# Patient Record
Sex: MALE | Race: WHITE | NOT HISPANIC OR LATINO | Employment: OTHER | URBAN - METROPOLITAN AREA
[De-identification: names, ages, dates, MRNs, and addresses within clinical notes are randomized per-mention and may not be internally consistent; named-entity substitution may affect disease eponyms.]

---

## 2018-03-29 ENCOUNTER — HISTORICAL (OUTPATIENT)
Dept: HEPATOLOGY | Facility: HOSPITAL | Age: 68
End: 2018-03-29

## 2018-03-29 LAB — POC CREATININE: 1 MG/DL (ref 0.6–1.3)

## 2018-04-02 ENCOUNTER — HISTORICAL (OUTPATIENT)
Dept: LAB | Facility: HOSPITAL | Age: 68
End: 2018-04-02

## 2018-04-02 LAB
ABS NEUT (OLG): 2.75 X10(3)/MCL (ref 2.1–9.2)
APPEARANCE, UA: CLEAR
BACTERIA SPEC CULT: NORMAL /HPF
BASOPHILS # BLD AUTO: 0 X10(3)/MCL (ref 0–0.2)
BASOPHILS NFR BLD AUTO: 0 %
BILIRUB UR QL STRIP: NEGATIVE
BUN SERPL-MCNC: 25 MG/DL (ref 7–18)
CALCIUM SERPL-MCNC: 9.3 MG/DL (ref 8.5–10.1)
CHLORIDE SERPL-SCNC: 103 MMOL/L (ref 98–107)
CO2 SERPL-SCNC: 29 MMOL/L (ref 21–32)
COLOR UR: YELLOW
CREAT SERPL-MCNC: 0.78 MG/DL (ref 0.7–1.3)
CREAT/UREA NIT SERPL: 32.1
EOSINOPHIL # BLD AUTO: 0.2 X10(3)/MCL (ref 0–0.9)
EOSINOPHIL NFR BLD AUTO: 4 %
ERYTHROCYTE [DISTWIDTH] IN BLOOD BY AUTOMATED COUNT: 13.2 % (ref 11.5–17)
GLUCOSE (UA): NEGATIVE
GLUCOSE SERPL-MCNC: 94 MG/DL (ref 74–106)
HCT VFR BLD AUTO: 39.4 % (ref 42–52)
HGB BLD-MCNC: 12.7 GM/DL (ref 14–18)
HGB UR QL STRIP: NEGATIVE
KETONES UR QL STRIP: NEGATIVE
LEUKOCYTE ESTERASE UR QL STRIP: NEGATIVE
LYMPHOCYTES # BLD AUTO: 1.6 X10(3)/MCL (ref 0.6–4.6)
LYMPHOCYTES NFR BLD AUTO: 30 %
MCH RBC QN AUTO: 30 PG (ref 27–31)
MCHC RBC AUTO-ENTMCNC: 32.2 GM/DL (ref 33–36)
MCV RBC AUTO: 92.9 FL (ref 80–94)
MONOCYTES # BLD AUTO: 0.6 X10(3)/MCL (ref 0.1–1.3)
MONOCYTES NFR BLD AUTO: 12 %
MRSA SCREEN BY PCR: NEGATIVE
NEUTROPHILS # BLD AUTO: 2.75 X10(3)/MCL (ref 1.4–7.9)
NEUTROPHILS NFR BLD AUTO: 53 %
NITRITE UR QL STRIP: NEGATIVE
PH UR STRIP: 7 [PH] (ref 5–9)
PLATELET # BLD AUTO: 183 X10(3)/MCL (ref 130–400)
PMV BLD AUTO: 10.3 FL (ref 9.4–12.4)
POTASSIUM SERPL-SCNC: 4.4 MMOL/L (ref 3.5–5.1)
PROT UR QL STRIP: NEGATIVE
RBC # BLD AUTO: 4.24 X10(6)/MCL (ref 4.7–6.1)
RBC #/AREA URNS HPF: NORMAL /[HPF]
SODIUM SERPL-SCNC: 138 MMOL/L (ref 136–145)
SP GR UR STRIP: 1.02 (ref 1–1.03)
SQUAMOUS EPITHELIAL, UA: NORMAL
UA WBC MAN: NORMAL
UROBILINOGEN UR STRIP-ACNC: 0.2
WBC # SPEC AUTO: 5.2 X10(3)/MCL (ref 4.5–11.5)
WBC #/AREA URNS HPF: NORMAL /[HPF]

## 2018-04-13 ENCOUNTER — HISTORICAL (OUTPATIENT)
Dept: RADIOLOGY | Facility: HOSPITAL | Age: 68
End: 2018-04-13

## 2018-05-01 ENCOUNTER — HISTORICAL (OUTPATIENT)
Dept: ADMINISTRATIVE | Facility: HOSPITAL | Age: 68
End: 2018-05-01

## 2018-05-01 LAB
ABS NEUT (OLG): 3.31 X10(3)/MCL (ref 2.1–9.2)
BASOPHILS # BLD AUTO: 0 X10(3)/MCL (ref 0–0.2)
BASOPHILS NFR BLD AUTO: 0 %
BUN SERPL-MCNC: 18 MG/DL (ref 7–18)
CALCIUM SERPL-MCNC: 8.9 MG/DL (ref 8.5–10.1)
CHLORIDE SERPL-SCNC: 109 MMOL/L (ref 98–107)
CO2 SERPL-SCNC: 25 MMOL/L (ref 21–32)
CREAT SERPL-MCNC: 0.74 MG/DL (ref 0.7–1.3)
CREAT/UREA NIT SERPL: 24.3
EOSINOPHIL # BLD AUTO: 0.2 X10(3)/MCL (ref 0–0.9)
EOSINOPHIL NFR BLD AUTO: 4 %
ERYTHROCYTE [DISTWIDTH] IN BLOOD BY AUTOMATED COUNT: 13.2 % (ref 11.5–17)
GLUCOSE SERPL-MCNC: 97 MG/DL (ref 74–106)
HCT VFR BLD AUTO: 35.7 % (ref 42–52)
HGB BLD-MCNC: 11.3 GM/DL (ref 14–18)
LYMPHOCYTES # BLD AUTO: 1.5 X10(3)/MCL (ref 0.6–4.6)
LYMPHOCYTES NFR BLD AUTO: 26 %
MCH RBC QN AUTO: 29.6 PG (ref 27–31)
MCHC RBC AUTO-ENTMCNC: 31.7 GM/DL (ref 33–36)
MCV RBC AUTO: 93.5 FL (ref 80–94)
MONOCYTES # BLD AUTO: 0.7 X10(3)/MCL (ref 0.1–1.3)
MONOCYTES NFR BLD AUTO: 12 %
NEUTROPHILS # BLD AUTO: 3.31 X10(3)/MCL (ref 2.1–9.2)
NEUTROPHILS NFR BLD AUTO: 57 %
PLATELET # BLD AUTO: 160 X10(3)/MCL (ref 130–400)
PMV BLD AUTO: 9.4 FL (ref 9.4–12.4)
POTASSIUM SERPL-SCNC: 4.3 MMOL/L (ref 3.5–5.1)
RBC # BLD AUTO: 3.82 X10(6)/MCL (ref 4.7–6.1)
SODIUM SERPL-SCNC: 140 MMOL/L (ref 136–145)
WBC # SPEC AUTO: 5.8 X10(3)/MCL (ref 4.5–11.5)

## 2021-01-26 ENCOUNTER — HISTORICAL (OUTPATIENT)
Dept: ADMINISTRATIVE | Facility: HOSPITAL | Age: 71
End: 2021-01-26

## 2021-05-13 ENCOUNTER — HOSPITAL ENCOUNTER (OUTPATIENT)
Dept: INTENSIVE CARE | Facility: HOSPITAL | Age: 71
End: 2021-05-14
Attending: NEUROLOGICAL SURGERY

## 2021-11-18 ENCOUNTER — HISTORICAL (OUTPATIENT)
Dept: ADMINISTRATIVE | Facility: HOSPITAL | Age: 71
End: 2021-11-18

## 2022-04-10 ENCOUNTER — HISTORICAL (OUTPATIENT)
Dept: ADMINISTRATIVE | Facility: HOSPITAL | Age: 72
End: 2022-04-10
Payer: MEDICARE

## 2022-04-24 VITALS
WEIGHT: 215 LBS | HEIGHT: 68 IN | SYSTOLIC BLOOD PRESSURE: 116 MMHG | DIASTOLIC BLOOD PRESSURE: 80 MMHG | BODY MASS INDEX: 32.58 KG/M2

## 2022-04-30 NOTE — OP NOTE
Patient:   Lloyd MDRaul            MRN: [id number]            FIN: [id number]               Age:   70 years     Sex:  Male     :  1950   Associated Diagnoses:   None   Author:   Azam Lacy MD      Operative Note   DATE OF OPERATION:  May 13, 2021    PREOPERATIVE DIAGNOSIS:  1.  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  Lumbar central and lateral recess stenosis with neurogenic claudication    SURGEON:  Azam Lacy M.D.   ASSISTANT: FLIP Ravi    PROCEDURE:  1.  Bilateral L2 hemilaminotomies and bilateral L2-3 medial facetectomies and foraminotomies  2.  Bilateral L3 hemilaminotomies and bilateral L3-4 medial facetectomies and foraminotomies  3.  Bilateral L4 hemilaminotomies and bilateral L4-5 medial facetectomies and foraminotomies  4.  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  He did well from that standpoint, but has had progressively worsening lower extremity pain, numbness, weakness and significant gait disturbance..  Imaging study showed severe central and lateral recess stenosis at L2-3, L3-4 and L4-5.  Options were discussed and, after conservative management failed, surgery was elected.  The patient and their family understood and accepted the nature of this surgery as well as its attendant risks.    FINDINGS:  There were no untoward findings.  As expected, there was severe central and lateral recess stenosis at all 3 levels on a multifactorial basis including facet arthropathy, ligamentous buckling and disc bulging.  There is excellent decompression of the thecal sac as well as the exiting/traversing nerve  roots at each level at the completion of the procedure.  A drain was placed over the laminotomy defects and brought out through a separate stab incision.    PROCEDURE IN DETAIL:  After endotracheal intubation and induction of general anesthesia, the patient was placed in the prone position on the spinal frame with pressure points appropriately padded.  The frame was cranked up and the back was prepped and draped in the usual fashion.  The patient received intravenous antibiotics prior to the start of the procedure.  The C-arm was used to guide the placement of the initial incision.    A 2 cm incision was made through the skin, subcutaneous tissues and fascia on the appropriate side after infiltrating the skin and muscle with 1/4% Marcaine containing 1/200,000 epinephrine.  Then progressive dilators were inserted down to the lamina and then a 16-18 mm guide tube was put into place and positioned appropriately according to the C-arm.  Then the operating microscope was brought into place.  Muscle was cleared off of the lateral inferior portion of the lamina and then the high speed drill, curette and Kerrison rongeurs were used to create a hemilaminotomy on the ipsilateral side.  The superior articulating facet of the inferior level was then undercut and then the ligamentum flavum was excised.  Once complete decompression of the lateral recess and proximal foramen was achieved, then the guide tube was angled medially, the base of the spinolaminar junction was removed with the high-speed drill and a combination of curettes and Kerrison rongeurs were then used to decompress the contralateral lateral recess and foramen. Meticulous hemostasis was achieved with a combination of bipolar cautery and hemostatic agent which was removed at completion. The wound was irrigated copiously with antibiotic irrigating solution.  The same procedure was carried out at each additional level.  A drain was placed over the laminotomy defects  and brought out through a separate stab incision.    The guide tube was removed slowly in a rotating fashion with bipolar cautery of the soft tissues and then the fascia was closed with 0 Vicryl and the subcutaneous tissue was closed with 2-0 Vicryl in separate layers.  Dermabond skin glue was used for the skin edges and the patient was returned to the supine position.  The patient was then taken to the post anesthetic care unit in satisfactory condition with correct sponge and needle counts.

## 2022-04-30 NOTE — H&P
Patient:   Raul Desai            MRN: [id number]            FIN: [id number]               Age:   67 years     Sex:  Male     :  1950   Associated Diagnoses:   None   Author:   Regino HERNÁNDEZ, Azam      Health Status   The H&P was reviewed, the patient was examined, and there are no changes to the patient's condition..

## 2022-04-30 NOTE — H&P
Patient:   Raul Desai            MRN: [id number]            FIN: [id number]               Age:   70 years     Sex:  Male     :  1950   Associated Diagnoses:   None   Author:   Britni RENEE, Ligia Burks      Health Status   The H&P was reviewed, the patient was examined, and there are no changes to the patient's condition..

## 2022-04-30 NOTE — OP NOTE
"   Patient:   Raul Desai            MRN: [id number]            FIN: [id number]               Age:   67 years     Sex:  Male     :  1950   Associated Diagnoses:   None   Author:   Azam Lacy MD      Operative Note   DATE OF OPERATION:  May 1, 2018    PREOPERATIVE DIAGNOSIS:  1.Medically intractable Parkinson's disease    POSTOPERATIVE DIAGNOSIS:  1.Medically intractable Parkinson's disease    SURGEON:  Azam Lacy M.D.   ASSISTANT: Ligia CASTELAN    PROCEDURE:  1.  Left DBS IPG implantation for bilateral STN DBS electrodes with [employer] Activa PC generator; stage 2 of 2  2. Interrogation of the DBS IPG    ANESTHESIA:  General endotracheal    BLOOD LOSS:  Negligible    SPECIMEN(s):  None    COMPLICATIONS:  None    HISTORY:  Raul Desai is a 67-year-old gentleman who on 18 underwent bilateral STN DBS implantation without incident.  He actually had excellent tremor control for about a week but it has returned, as expected, to baseline.  Options were discussed and the patient now presents for stage 2 of 2; insertion and connection of the IPG to the existing DBS leads. The patient understood and accepted the nature of the surgery as well as its attendant risks.    FINDINGS:  There were no untoward findings. Impedance checks were all within normal limits. The opaque "boot" was placed over the connection for the right sided electrode. The patient tolerated the procedure well.    PROCEDURE IN DETAIL:  After endotracheal intubation and induction of general anesthesia, the patient's head was placed on a donut and turned away from the planned operative side. The patient received an appropriate dose of intravenous antibiotic prior to the start of procedure. The ipsilateral side of the head, neck and chest were shaved, prepped and draped in the usual fashion.  The region of the scalp where the distal portion of the electrodes had been placed was marked out and infiltrated with 1/2% " "Xylocaine containing 1:200,000 Epinephrine.  An incision was also marked out and infiltrated under the ipsilateral infraclavicular region.  The scalp incision was carried down to the skin and subcutaneous tissues of the knife and then dissection was carried out to find the distal end of the electrode array.  This was identified and pulled into the operative site.  Then attention was paid to the infraclavicular incision which was carried down through the skin and subcutaneous tissues with the knife.  Sharp and blunt dissection was carried out subcutaneously inferiorly to create a pocket for the pulse generator.  Then, the electrode passer was inserted into the scalp incision and brought out through the infraclavicular incision.  The proximal ends of the extension leads were then threaded up into the passer and then the passer was removed from the scalp incision.  The distal portion of the DBS electrodes were then exposed and attached to the proximal end of the extension lead after placing new "boots" over the DBS electrodes.  These was then secured with "finger tight" screw tightening using the torque limiting  and 2-0 silk ties over the "boots".   Then the distal portion of the extension leads were attached to the pulse generator and these screws were also tightened.  Then the generator was placed into the pocket, keeping any excess lead underneath the generator. The system was tested for impedance of each contact. The generator was secured to the fascia with a single 3-0 Prolene suture.  The wound was irrigated copiously with antibiotic irrigation.  The wound was then closed without any tension with 2-0 Vicryl for the subcutaneous layer and Dermabond glue for the skin edges.  The scalp incision was also closed after irrigating copiously with antibiotic irrigation with 2-0 Vicryl for the galea and staples for the skin edges.  Local dressings were applied and the patient was taken to the post anesthetic care " unit in satisfactory condition with correct sponge and needle counts.

## 2022-06-07 PROBLEM — M48.061 SPINAL STENOSIS OF LUMBAR REGION: Status: ACTIVE | Noted: 2022-06-07

## 2022-06-07 PROBLEM — Z96.89 S/P DEEP BRAIN STIMULATOR PLACEMENT: Chronic | Status: ACTIVE | Noted: 2022-06-07

## 2022-06-07 PROBLEM — G20.A1 PARKINSON'S DISEASE: Status: ACTIVE | Noted: 2022-06-07

## 2022-06-07 PROBLEM — R41.89 IMPAIRED COGNITION: Status: ACTIVE | Noted: 2022-06-07

## 2022-06-07 PROBLEM — F48.2 PSEUDOBULBAR AFFECT: Status: ACTIVE | Noted: 2022-06-07

## 2022-06-07 PROBLEM — G20.A1 PARKINSON'S DISEASE: Chronic | Status: ACTIVE | Noted: 2022-06-07

## 2022-06-07 PROBLEM — Z96.89 S/P DEEP BRAIN STIMULATOR PLACEMENT: Status: ACTIVE | Noted: 2022-06-07

## 2022-06-07 PROBLEM — F48.2 PSEUDOBULBAR AFFECT: Chronic | Status: ACTIVE | Noted: 2022-06-07

## 2022-06-07 PROBLEM — R41.89 IMPAIRED COGNITION: Chronic | Status: ACTIVE | Noted: 2022-06-07

## 2022-06-07 PROBLEM — M48.061 SPINAL STENOSIS OF LUMBAR REGION: Chronic | Status: ACTIVE | Noted: 2022-06-07

## 2022-06-07 RX ORDER — CARBIDOPA AND LEVODOPA 25; 100 MG/1; MG/1
2 TABLET ORAL 4 TIMES DAILY
COMMUNITY
Start: 2021-08-31 | End: 2023-05-19 | Stop reason: SDUPTHER

## 2022-06-07 RX ORDER — BACLOFEN 20 MG/1
TABLET ORAL
COMMUNITY
Start: 2021-10-07 | End: 2022-06-08

## 2022-06-07 RX ORDER — AMANTADINE HYDROCHLORIDE 100 MG/1
CAPSULE, GELATIN COATED ORAL
COMMUNITY
Start: 2021-12-06 | End: 2022-08-29

## 2022-06-07 RX ORDER — CARBIDOPA AND LEVODOPA 25; 100 MG/1; MG/1
TABLET, EXTENDED RELEASE ORAL
COMMUNITY
Start: 2021-08-25 | End: 2022-06-08

## 2022-06-07 RX ORDER — ACETAMINOPHEN 500 MG
10 TABLET ORAL NIGHTLY
COMMUNITY
Start: 2021-10-07

## 2022-06-07 RX ORDER — GABAPENTIN 100 MG/1
100 CAPSULE ORAL NIGHTLY
COMMUNITY
Start: 2021-08-25 | End: 2022-09-19

## 2022-06-07 NOTE — ASSESSMENT & PLAN NOTE
Changes:  No progr changes today but needs new batter and wants recharg     Programming minutes:  15m

## 2022-06-07 NOTE — PROGRESS NOTES
"Subjective:         Patient ID: Raul Desai is a 71 y.o. male.    Chief Complaint: F/U PD-DBS.    HPI:            (Pts wife is here with the pt today. Pt states he has a shuffled-freezing gait w falls. Ambulates with a walker. Pt denies any tremors or hallucinations. Pt has diff w speech, swallowing and drooling. Sleeping well vivid dreams. Pt does not drive, lives at home with his wife and is able to do all ADL's alone. Pt had back surgery in 02/2022. Pts wife state rt leg and foot move all the time.)      Review of Systems    [facility]  ...Here for PD, DBS f/u  ..  pt reports tremors are well controlled; cont w [id number] (2 tabs QID) and CD/LD CR 25-100mg (1 tab qhs).   amantadine four times daily  crying spells have resolved; taking Nuedexta (1 tab BID) and has been significantly helpful for him.    dyskinesia in R foot and R hand since last visit.   cont w occ falls "we see the floor alot"    use of walker at all times, diff w depth perception and misjudging distances.  1. Parkinson disease G20 if today's DBS adj's don't help the R dyskinesias then he could decrease the levodopa to 1.5 each of four times per day  2. S/P deep brain stimulator placement Z96.89 programming done    aim for 3 mos return                Social History     Socioeconomic History    Marital status:    Tobacco Use    Smoking status: Never Smoker    Smokeless tobacco: Never Used   Substance and Sexual Activity    Alcohol use: Not Currently    Drug use: Never         Current Outpatient Medications:     carbidopa-levodopa  mg (SINEMET CR)  mg TbSR, Take by mouth., Disp: , Rfl:     dextromethorphan-quinidine 20-10 mg (NUEDEXTA) 20-10 mg per capsule, Take by mouth., Disp: , Rfl:     amantadine HCL (SYMMETREL) 100 mg capsule,  See Instructions, TAKE 1 CAPSULE BY MOUTH FOUR TIMES DAILY, # 120 cap(s), 5 Refill(s), Pharmacy: [id number], 172.72, cm, Height/Length " "Dosing, 10/07/21 7:38:00 CDT, 97.52, kg, Weight Dosing, 10/07/21 7:38:00 CDT, Disp: , Rfl:     carbidopa-levodopa  mg (SINEMET)  mg per tablet,  See Instructions, TAKE 2 TABLETS BY MOUTH FOUR TIMES DAILY, # 720 tab(s), 2 Refill(s), Pharmacy: [facility] #[id number], 172.72, cm, Height/Length Dosing, 08/25/21 8:05:00 CDT, 97.52, kg, Weight Dosing, 08/25/21 8:05:00 CDT, Disp: , Rfl:     cholecalciferol, vitamin D3, 100 mcg (4,000 unit) Cap capsule, Take by mouth., Disp: , Rfl:     gabapentin (NEURONTIN) 100 MG capsule, Take 100 mg by mouth., Disp: , Rfl:     gabapentin (NEURONTIN) 100 MG capsule, Take 100 mg by mouth., Disp: , Rfl:     lisinopriL (PRINIVIL,ZESTRIL) 5 MG tablet, Take 5 mg by mouth once daily., Disp: , Rfl:     melatonin (MELATIN) 5 mg, Take 5 mg by mouth., Disp: , Rfl:     MULTIVITAMIN ORAL, Take by mouth., Disp: , Rfl:     rosuvastatin (CRESTOR) 10 MG tablet, Take 10 mg by mouth once daily., Disp: , Rfl:      levod IR 2  Qid and one bedtime (CR)    Objective:        Exam    BP (!) 132/92   Ht 5' 8" (1.727 m)   Wt 97.1 kg (214 lb)   BMI 32.54 kg/m²       General:   __unacccompanied       accompanied, by:_  Wife     heart__  pharynx:    Neurological    Speech: dysarthric   cranial nerves:  vis fields:  EOMs: ok   funduscopic:  motor  coord:   Gait:  Walker     Imaging:   Images and imaging reports reviewed.  Study / studies:   Rads summary:  radiologist:  My comments:     Sleep study data or PAP adherence data:     Labs:      meds:      _._ multiple issues/ diagnoses or problems [if not enumerated in note then discussed in encounter but chose to or failed to document]    complexity of data   _._high _mod   __ images and reports reviewed:  _._ hx obtained from family or accompaniment:   __other studies reviewed   __studies ordered __   __studies discussed but not ordered __  __DDx discussed __    risks  .__high _mod   _._ neurodegenerative condition expected to progress( " possible or definite)   __ autoimmune condition with possibility of flares or unexpected attack( poss or def)    __ seiz d.o. with possib of recurr seiz's (poss or def)      __ recent TIA/stroke or chronic cerebrovasc ds with risk of recurrence of stroke  __ potentially high risk meds (and/or) CNS medications which may cause medical or behavioral side effects  __. fall risk  _._ driving discussed   __ diagnosis unclear or DDx wide making risk uncertain to high  __other:    MDM/Medical Decision Making used for CPT choice based on above elements:  _._high _moderate           Assessment/Plan:       Problem List Items Addressed This Visit        Neuro    Parkinson's disease - Primary (Chronic)    Current Assessment & Plan     Med  Changes:  No changes            S/P deep brain stimulator placement (Chronic)    Current Assessment & Plan     Changes:  No progr changes today but needs new batter and wants recharg     Programming minutes:  15m            Impaired cognition (Chronic)    Current Assessment & Plan     Discussed donep   Held off       Advised he not drive            Spinal stenosis of lumbar region (Chronic)       Psychiatric    Pseudobulbar affect (Chronic)    Current Assessment & Plan     Laughed at times when I thought he'd frown                  Other comments/ follow up:          No orders of the defined types were placed in this encounter.          __new Rx/ med prescribed  __med/ Rx         modified  __med               refilled     Aim follow up _3__ months _.__Dr. LANDAVERDE   Dbs day       __NANDO NP       __DAI, NP     Vitaly Adams MD

## 2022-06-08 ENCOUNTER — OFFICE VISIT (OUTPATIENT)
Dept: NEUROLOGY | Facility: CLINIC | Age: 72
End: 2022-06-08
Payer: MEDICARE

## 2022-06-08 VITALS
BODY MASS INDEX: 32.43 KG/M2 | WEIGHT: 214 LBS | DIASTOLIC BLOOD PRESSURE: 92 MMHG | SYSTOLIC BLOOD PRESSURE: 132 MMHG | HEIGHT: 68 IN

## 2022-06-08 DIAGNOSIS — F48.2 PSEUDOBULBAR AFFECT: Chronic | ICD-10-CM

## 2022-06-08 DIAGNOSIS — Z96.89 S/P DEEP BRAIN STIMULATOR PLACEMENT: Chronic | ICD-10-CM

## 2022-06-08 DIAGNOSIS — M48.062 SPINAL STENOSIS OF LUMBAR REGION WITH NEUROGENIC CLAUDICATION: Chronic | ICD-10-CM

## 2022-06-08 DIAGNOSIS — R41.89 IMPAIRED COGNITION: Chronic | ICD-10-CM

## 2022-06-08 DIAGNOSIS — G20.A1 PARKINSON'S DISEASE: Primary | Chronic | ICD-10-CM

## 2022-06-08 PROCEDURE — 95983 PR ELEC ANALYSIS, IMPLT NEURO PULSE GEN, W/PRGRM, BRAIN, 1ST 15 MINS: ICD-10-PCS | Mod: S$PBB,,, | Performed by: SPECIALIST

## 2022-06-08 PROCEDURE — 99215 OFFICE O/P EST HI 40 MIN: CPT | Mod: 25,S$PBB,, | Performed by: SPECIALIST

## 2022-06-08 PROCEDURE — 95983 ALYS BRN NPGT PRGRMG 15 MIN: CPT | Mod: PBBFAC | Performed by: SPECIALIST

## 2022-06-08 PROCEDURE — 99215 PR OFFICE/OUTPT VISIT, EST, LEVL V, 40-54 MIN: ICD-10-PCS | Mod: 25,S$PBB,, | Performed by: SPECIALIST

## 2022-06-08 PROCEDURE — 99999 PR PBB SHADOW E&M-EST. PATIENT-LVL III: ICD-10-PCS | Mod: PBBFAC,,, | Performed by: SPECIALIST

## 2022-06-08 PROCEDURE — 95983 ALYS BRN NPGT PRGRMG 15 MIN: CPT | Mod: S$PBB,,, | Performed by: SPECIALIST

## 2022-06-08 PROCEDURE — 99213 OFFICE O/P EST LOW 20 MIN: CPT | Mod: PBBFAC,25 | Performed by: SPECIALIST

## 2022-06-08 PROCEDURE — 99999 PR PBB SHADOW E&M-EST. PATIENT-LVL III: CPT | Mod: PBBFAC,,, | Performed by: SPECIALIST

## 2022-06-08 RX ORDER — GABAPENTIN 100 MG/1
100 CAPSULE ORAL NIGHTLY
COMMUNITY
End: 2023-11-13

## 2022-06-08 RX ORDER — CARBIDOPA AND LEVODOPA 25; 100 MG/1; MG/1
1 TABLET, EXTENDED RELEASE ORAL NIGHTLY
COMMUNITY
Start: 2022-03-09 | End: 2023-02-20

## 2022-06-08 RX ORDER — ROSUVASTATIN CALCIUM 10 MG/1
1 TABLET, COATED ORAL DAILY
COMMUNITY
Start: 2022-05-26

## 2022-06-08 RX ORDER — DEXTROMETHORPHAN HYDROBROMIDE AND QUINIDINE SULFATE 20; 10 MG/1; MG/1
1 CAPSULE, GELATIN COATED ORAL EVERY 12 HOURS
COMMUNITY
Start: 2022-03-09 | End: 2022-12-30 | Stop reason: SDUPTHER

## 2022-06-08 RX ORDER — LISINOPRIL 5 MG/1
5 TABLET ORAL DAILY
COMMUNITY
Start: 2022-05-26

## 2022-06-14 ENCOUNTER — TELEPHONE (OUTPATIENT)
Dept: NEUROSURGERY | Facility: CLINIC | Age: 72
End: 2022-06-14
Payer: MEDICARE

## 2022-06-14 NOTE — TELEPHONE ENCOUNTER
Received referral from Dr. Adams for IPG replacement. They do not have an interrogation, but per [employer] rep, his battery needs to be replaced within the next 2 months. The battery voltage is 2.58v and will turn off at 2.4v. Surgery is 7/5, office visit/preop here on 6/27 w/ Ligia. patient is aware.

## 2022-06-27 ENCOUNTER — OFFICE VISIT (OUTPATIENT)
Dept: NEUROSURGERY | Facility: CLINIC | Age: 72
End: 2022-06-27
Payer: MEDICARE

## 2022-06-27 VITALS
HEART RATE: 80 BPM | HEIGHT: 68 IN | DIASTOLIC BLOOD PRESSURE: 73 MMHG | BODY MASS INDEX: 32.13 KG/M2 | WEIGHT: 212 LBS | RESPIRATION RATE: 16 BRPM | SYSTOLIC BLOOD PRESSURE: 114 MMHG

## 2022-06-27 DIAGNOSIS — I10 HYPERTENSION, UNSPECIFIED TYPE: ICD-10-CM

## 2022-06-27 DIAGNOSIS — G89.18 ACUTE POST-OPERATIVE PAIN: ICD-10-CM

## 2022-06-27 DIAGNOSIS — Z45.42 END OF BATTERY LIFE OF DEEP BRAIN STIMULATOR: ICD-10-CM

## 2022-06-27 DIAGNOSIS — G20.A1 PARKINSON'S DISEASE: Primary | ICD-10-CM

## 2022-06-27 DIAGNOSIS — Z96.89 S/P DEEP BRAIN STIMULATOR PLACEMENT: Chronic | ICD-10-CM

## 2022-06-27 PROCEDURE — 95972 ALYS CPLX SP/PN NPGT W/PRGRM: CPT | Mod: ,,, | Performed by: NURSE PRACTITIONER

## 2022-06-27 PROCEDURE — 99214 PR OFFICE/OUTPT VISIT, EST, LEVL IV, 30-39 MIN: ICD-10-PCS | Mod: ,,, | Performed by: NURSE PRACTITIONER

## 2022-06-27 PROCEDURE — 95972 PR PRG SPNL GEN CMPLX: ICD-10-PCS | Mod: ,,, | Performed by: NURSE PRACTITIONER

## 2022-06-27 PROCEDURE — 99214 OFFICE O/P EST MOD 30 MIN: CPT | Mod: ,,, | Performed by: NURSE PRACTITIONER

## 2022-06-27 RX ORDER — ASPIRIN 81 MG/1
81 TABLET ORAL DAILY
Status: ON HOLD | COMMUNITY
End: 2022-09-19 | Stop reason: HOSPADM

## 2022-06-27 RX ORDER — HYDROCODONE BITARTRATE AND ACETAMINOPHEN 5; 325 MG/1; MG/1
1 TABLET ORAL EVERY 6 HOURS PRN
Qty: 28 TABLET | Refills: 0 | Status: SHIPPED | OUTPATIENT
Start: 2022-06-27 | End: 2022-09-19

## 2022-06-27 NOTE — H&P (VIEW-ONLY)
[facility]  Neurosurgery  Office Visit    HPI:  The patient presents today for pre-operative appointment.  The patient is known to Dr. Lacy after having undergone bilateral STN DBS implantation on 04/17/2018.  He underwent placement of IPG for DBS on 05/01/2018.  Surgical history also includes bilateral L2-3, L3-4, L4-5 decompression with Dr. Lacy on 05/13/2021.  He has continued following with Dr. Adams for Parkinson's disease and DBS management.  He was seen in the office in June and was noted to need battery replacement. He was referred back to Dr. Lacy for removal and replacement of IPG.  He presents today to discuss surgery.    He thinks he had some improvements in his falls since Dr. Adams adjusted his DBS at the last appointment. He does continue with some dyskinesias in the right leg. He has not adjusted any medications. He is very satisfied with DBS. He is in therapy for left knee issues, which is helping with the falls. He does continue with balance issues. He ambulates with a walker. He had ablations with Dr. Jocelyn Shafer which has almost completely relieved his back pain. His legs still feel much better since lumbar decompression with Dr. Lacy.       Patient Active Problem List    Diagnosis Date Noted    End of battery life of deep brain stimulator 06/27/2022    Parkinson's disease 06/07/2022    S/P deep brain stimulator placement 06/07/2022    Pseudobulbar affect 06/07/2022    Impaired cognition 06/07/2022    Spinal stenosis of lumbar region 06/07/2022     Past Medical History:   Diagnosis Date    Facet arthropathy, lumbar     Hyperlipidemia     Low back pain     Lumbar stenosis with neurogenic claudication     Other intervertebral disc degeneration, lumbar region     Parkinson's disease      Past Surgical History:   Procedure Laterality Date    ACL repair in left knee  1983    bilateral L2-3, L3-4, L4-5 decompression  05/13/2021    [facility]    Bilateral STN DBS   "04/17/2018    [facility]    DEEP BRAIN STIMULATOR PLACEMENT      IPG placement  05/01/2018    [facility]    PROSTATECTOMY       (Not in a hospital admission)    Review of patient's allergies indicates:   Allergen Reactions    Zolpidem Hallucinations     Social History     Tobacco Use    Smoking status: Never Smoker    Smokeless tobacco: Never Used   Substance Use Topics    Alcohol use: Not Currently     Family History   Problem Relation Age of Onset    Osteoporosis Mother     Colon cancer Mother     Breast cancer Mother     Hypertension Father     Breast cancer Sister        Vital Signs  Pulse: 80  Resp: 16  BP: 114/73  BP Location: Other (Comment)  Patient Position: Sitting  Pain Score: 0-No pain  Height and Weight  Height: 5' 8" (172.7 cm)  Weight: 96.2 kg (212 lb)  BSA (Calculated - sq m): 2.15 sq meters  BMI (Calculated): 32.2  Weight in (lb) to have BMI = 25: 164.1]      Physical Exam:  General: well developed, well nourished, no distress.   Head: normocephalic, atraumatic  Respiratory: respiration non-labored; clear to auscultation  Cardiac: RRR, No murmur  GI: abd soft, non-tender, non-distended  Pulses: 2+ and symmetric radial and dorsalis pedis. No lower extremity edema  Neurologic: Alert and oriented. Thought content appropriate.  GCS: Motor: 6/Verbal: 5/Eyes: 4 GCS Total: 15  Mental Status: Awake, Alert, Oriented x3  Sensory: intact to light touch throughout  Motor Strength:Moves all extremities spontaneously with good tone.  Full strength upper and lower extremities.   Pulses: 2+ and symmetric radial and dorsalis pedis. No lower extremity edema  Gait: walker, freezing     Right chest wall IPG incision is well healed.   IPG interrogated, 2.56v      Assessment/Plan:  Problem List Items Addressed This Visit        Neuro    Parkinson's disease - Primary (Chronic)    Relevant Orders    CBC Auto Differential    Comprehensive Metabolic Panel    S/P deep brain stimulator placement (Chronic)    End of battery " life of deep brain stimulator      Other Visit Diagnoses     Hypertension, unspecified type        Relevant Orders    X-Ray Chest PA And Lateral    EKG 12-lead    Acute post-operative pain             The patient was seen and examined by Dr. Lacy. The nature of the procedure, as well as its attendant risks, were discussed in detail with the patient.  All questions were answered.  They are agreement with proceeding with surgery as planned, and are tentatively scheduled for removal and replacement of IPG for DBS on 7/5/2022.

## 2022-06-27 NOTE — PATIENT INSTRUCTIONS
-STOP aspirin and multivitamin 7 days before surgery (Now)  -Nothing to eat or drink after midnight the night before surgery EXCEPT for: Parkinson's medications with a sip of water.

## 2022-06-28 DIAGNOSIS — Z45.42 END OF BATTERY LIFE OF DEEP BRAIN STIMULATOR: Primary | ICD-10-CM

## 2022-06-28 DIAGNOSIS — T85.193A: ICD-10-CM

## 2022-06-28 RX ORDER — MUPIROCIN 20 MG/G
1 OINTMENT TOPICAL 2 TIMES DAILY
Status: CANCELLED | OUTPATIENT
Start: 2022-06-28 | End: 2022-06-29

## 2022-06-29 ENCOUNTER — HOSPITAL ENCOUNTER (OUTPATIENT)
Dept: RADIOLOGY | Facility: HOSPITAL | Age: 72
Discharge: HOME | End: 2022-06-29
Attending: NURSE PRACTITIONER
Payer: MEDICARE

## 2022-06-29 DIAGNOSIS — I10 HYPERTENSION, UNSPECIFIED TYPE: ICD-10-CM

## 2022-06-29 PROCEDURE — 71046 X-RAY EXAM CHEST 2 VIEWS: CPT | Mod: TC

## 2022-07-01 ENCOUNTER — PATIENT MESSAGE (OUTPATIENT)
Dept: NEUROSURGERY | Facility: CLINIC | Age: 72
End: 2022-07-01
Payer: MEDICARE

## 2022-07-01 ENCOUNTER — ANESTHESIA EVENT (OUTPATIENT)
Dept: SURGERY | Facility: HOSPITAL | Age: 72
End: 2022-07-01
Payer: MEDICARE

## 2022-07-01 RX ORDER — IBUPROFEN 200 MG
200 TABLET ORAL EVERY 6 HOURS PRN
Status: ON HOLD | COMMUNITY
End: 2022-07-05 | Stop reason: HOSPADM

## 2022-07-02 ENCOUNTER — PATIENT MESSAGE (OUTPATIENT)
Dept: ADMINISTRATIVE | Facility: OTHER | Age: 72
End: 2022-07-02
Payer: MEDICARE

## 2022-07-04 ENCOUNTER — PATIENT MESSAGE (OUTPATIENT)
Dept: ADMINISTRATIVE | Facility: OTHER | Age: 72
End: 2022-07-04
Payer: MEDICARE

## 2022-07-05 ENCOUNTER — HOSPITAL ENCOUNTER (OUTPATIENT)
Facility: HOSPITAL | Age: 72
Discharge: HOME | End: 2022-07-05
Attending: NEUROLOGICAL SURGERY | Admitting: NEUROLOGICAL SURGERY
Payer: MEDICARE

## 2022-07-05 ENCOUNTER — ANESTHESIA (OUTPATIENT)
Dept: SURGERY | Facility: HOSPITAL | Age: 72
End: 2022-07-05
Payer: MEDICARE

## 2022-07-05 VITALS
SYSTOLIC BLOOD PRESSURE: 152 MMHG | BODY MASS INDEX: 31.07 KG/M2 | TEMPERATURE: 98 F | HEIGHT: 68 IN | HEART RATE: 64 BPM | RESPIRATION RATE: 15 BRPM | OXYGEN SATURATION: 97 % | DIASTOLIC BLOOD PRESSURE: 81 MMHG | WEIGHT: 205 LBS

## 2022-07-05 DIAGNOSIS — Z45.42 END OF BATTERY LIFE OF DEEP BRAIN STIMULATOR: ICD-10-CM

## 2022-07-05 DIAGNOSIS — T85.193A: ICD-10-CM

## 2022-07-05 DIAGNOSIS — T85.193A OTHER MECHANICAL COMPLICATION OF IMPLANTED ELECTRONIC NEUROSTIMULATOR, GENERATOR, INITIAL ENCOUNTER: ICD-10-CM

## 2022-07-05 DIAGNOSIS — Z96.89 S/P DEEP BRAIN STIMULATOR PLACEMENT: Primary | Chronic | ICD-10-CM

## 2022-07-05 PROCEDURE — 63600175 PHARM REV CODE 636 W HCPCS: Performed by: NEUROLOGICAL SURGERY

## 2022-07-05 PROCEDURE — 37000009 HC ANESTHESIA EA ADD 15 MINS: Performed by: NEUROLOGICAL SURGERY

## 2022-07-05 PROCEDURE — 36000711: Performed by: NEUROLOGICAL SURGERY

## 2022-07-05 PROCEDURE — 63600175 PHARM REV CODE 636 W HCPCS: Performed by: NURSE ANESTHETIST, CERTIFIED REGISTERED

## 2022-07-05 PROCEDURE — 37000008 HC ANESTHESIA 1ST 15 MINUTES: Performed by: NEUROLOGICAL SURGERY

## 2022-07-05 PROCEDURE — 61886 IMPLANT NEUROSTIM ARRAYS: CPT | Mod: ,,, | Performed by: NEUROLOGICAL SURGERY

## 2022-07-05 PROCEDURE — 61886 IMPLANT NEUROSTIM ARRAYS: CPT | Mod: AS,,, | Performed by: NURSE PRACTITIONER

## 2022-07-05 PROCEDURE — 25000003 PHARM REV CODE 250: Performed by: NEUROLOGICAL SURGERY

## 2022-07-05 PROCEDURE — 25000003 PHARM REV CODE 250: Performed by: NURSE ANESTHETIST, CERTIFIED REGISTERED

## 2022-07-05 PROCEDURE — 27201423 OPTIME MED/SURG SUP & DEVICES STERILE SUPPLY: Performed by: NEUROLOGICAL SURGERY

## 2022-07-05 PROCEDURE — 36000710: Performed by: NEUROLOGICAL SURGERY

## 2022-07-05 PROCEDURE — 71000033 HC RECOVERY, INTIAL HOUR: Performed by: NEUROLOGICAL SURGERY

## 2022-07-05 PROCEDURE — 61886 PR IMP STIM,CRANIAL,SUBQ,>1 ARRAY: ICD-10-PCS | Mod: ,,, | Performed by: NEUROLOGICAL SURGERY

## 2022-07-05 PROCEDURE — 71000016 HC POSTOP RECOV ADDL HR: Performed by: NEUROLOGICAL SURGERY

## 2022-07-05 PROCEDURE — 63600175 PHARM REV CODE 636 W HCPCS: Performed by: NURSE PRACTITIONER

## 2022-07-05 PROCEDURE — 71000015 HC POSTOP RECOV 1ST HR: Performed by: NEUROLOGICAL SURGERY

## 2022-07-05 PROCEDURE — 61886 PR IMP STIM,CRANIAL,SUBQ,>1 ARRAY: ICD-10-PCS | Mod: AS,,, | Performed by: NURSE PRACTITIONER

## 2022-07-05 PROCEDURE — C1787 PATIENT PROGR, NEUROSTIM: HCPCS | Performed by: NEUROLOGICAL SURGERY

## 2022-07-05 PROCEDURE — C1820 GENERATOR NEURO RECHG BAT SY: HCPCS | Performed by: NEUROLOGICAL SURGERY

## 2022-07-05 DEVICE — DEVICE NEUROSTIMULATOR ACTIVA: Type: IMPLANTABLE DEVICE | Site: CHEST | Status: FUNCTIONAL

## 2022-07-05 RX ORDER — LIDOCAINE HYDROCHLORIDE 20 MG/ML
INJECTION, SOLUTION EPIDURAL; INFILTRATION; INTRACAUDAL; PERINEURAL
Status: DISCONTINUED | OUTPATIENT
Start: 2022-07-05 | End: 2022-07-05

## 2022-07-05 RX ORDER — BUPIVACAINE HCL/EPINEPHRINE 0.5-1:200K
VIAL (ML) INJECTION
Status: DISCONTINUED | OUTPATIENT
Start: 2022-07-05 | End: 2022-07-05 | Stop reason: HOSPADM

## 2022-07-05 RX ORDER — FENTANYL CITRATE 50 UG/ML
INJECTION, SOLUTION INTRAMUSCULAR; INTRAVENOUS
Status: DISCONTINUED | OUTPATIENT
Start: 2022-07-05 | End: 2022-07-05

## 2022-07-05 RX ORDER — SODIUM CHLORIDE, SODIUM LACTATE, POTASSIUM CHLORIDE, CALCIUM CHLORIDE 600; 310; 30; 20 MG/100ML; MG/100ML; MG/100ML; MG/100ML
INJECTION, SOLUTION INTRAVENOUS CONTINUOUS
Status: ACTIVE | OUTPATIENT
Start: 2022-07-05 | End: 2022-07-05

## 2022-07-05 RX ORDER — ACETAMINOPHEN 10 MG/ML
INJECTION, SOLUTION INTRAVENOUS
Status: DISCONTINUED | OUTPATIENT
Start: 2022-07-05 | End: 2022-07-05

## 2022-07-05 RX ORDER — CEFAZOLIN SODIUM 2 G/100ML
2 INJECTION, SOLUTION INTRAVENOUS
Status: COMPLETED | OUTPATIENT
Start: 2022-07-05 | End: 2022-07-05

## 2022-07-05 RX ORDER — PROPOFOL 10 MG/ML
VIAL (ML) INTRAVENOUS
Status: DISCONTINUED | OUTPATIENT
Start: 2022-07-05 | End: 2022-07-05

## 2022-07-05 RX ORDER — ONDANSETRON 2 MG/ML
INJECTION INTRAMUSCULAR; INTRAVENOUS
Status: DISCONTINUED | OUTPATIENT
Start: 2022-07-05 | End: 2022-07-05

## 2022-07-05 RX ORDER — MUPIROCIN 20 MG/G
1 OINTMENT TOPICAL 2 TIMES DAILY
Status: DISCONTINUED | OUTPATIENT
Start: 2022-07-05 | End: 2022-07-05 | Stop reason: HOSPADM

## 2022-07-05 RX ORDER — MEPERIDINE HYDROCHLORIDE 25 MG/ML
12.5 INJECTION INTRAMUSCULAR; INTRAVENOUS; SUBCUTANEOUS EVERY 10 MIN PRN
Status: DISCONTINUED | OUTPATIENT
Start: 2022-07-05 | End: 2022-07-05 | Stop reason: HOSPADM

## 2022-07-05 RX ORDER — MIDAZOLAM HYDROCHLORIDE 1 MG/ML
INJECTION INTRAMUSCULAR; INTRAVENOUS
Status: DISCONTINUED | OUTPATIENT
Start: 2022-07-05 | End: 2022-07-05

## 2022-07-05 RX ORDER — ONDANSETRON 2 MG/ML
4 INJECTION INTRAMUSCULAR; INTRAVENOUS ONCE AS NEEDED
Status: DISCONTINUED | OUTPATIENT
Start: 2022-07-05 | End: 2022-07-05 | Stop reason: HOSPADM

## 2022-07-05 RX ORDER — DEXAMETHASONE SODIUM PHOSPHATE 4 MG/ML
INJECTION, SOLUTION INTRA-ARTICULAR; INTRALESIONAL; INTRAMUSCULAR; INTRAVENOUS; SOFT TISSUE
Status: DISCONTINUED | OUTPATIENT
Start: 2022-07-05 | End: 2022-07-05

## 2022-07-05 RX ADMIN — PROPOFOL 200 MG: 10 INJECTION, EMULSION INTRAVENOUS at 07:07

## 2022-07-05 RX ADMIN — FENTANYL CITRATE 50 MCG: 50 INJECTION, SOLUTION INTRAMUSCULAR; INTRAVENOUS at 07:07

## 2022-07-05 RX ADMIN — SODIUM CHLORIDE, SODIUM GLUCONATE, SODIUM ACETATE, POTASSIUM CHLORIDE AND MAGNESIUM CHLORIDE: 526; 502; 368; 37; 30 INJECTION, SOLUTION INTRAVENOUS at 07:07

## 2022-07-05 RX ADMIN — MIDAZOLAM 2 MG: 1 INJECTION INTRAMUSCULAR; INTRAVENOUS at 07:07

## 2022-07-05 RX ADMIN — DEXAMETHASONE SODIUM PHOSPHATE 4 MG: 4 INJECTION, SOLUTION INTRA-ARTICULAR; INTRALESIONAL; INTRAMUSCULAR; INTRAVENOUS; SOFT TISSUE at 07:07

## 2022-07-05 RX ADMIN — CEFAZOLIN SODIUM 2 G: 2 INJECTION, SOLUTION INTRAVENOUS at 07:07

## 2022-07-05 RX ADMIN — LIDOCAINE HYDROCHLORIDE 40 MG: 20 INJECTION, SOLUTION EPIDURAL; INFILTRATION; INTRACAUDAL; PERINEURAL at 07:07

## 2022-07-05 RX ADMIN — ACETAMINOPHEN 1000 MG: 10 INJECTION, SOLUTION INTRAVENOUS at 07:07

## 2022-07-05 RX ADMIN — ONDANSETRON 4 MG: 2 INJECTION INTRAMUSCULAR; INTRAVENOUS at 07:07

## 2022-07-05 NOTE — ANESTHESIA PREPROCEDURE EVALUATION
"                                                                                                             07/05/2022  Raul Desai is a 71 y.o., male .  Notes 6/27/2022:   "The patient is known to Dr. Lacy after having undergone bilateral STN DBS implantation on 04/17/2018.  He underwent placement of IPG for DBS on 05/01/2018. He has continued following with Dr. Adams for Parkinson's disease and DBS management. He was seen in the office in June and was noted to need battery replacement. He was referred back to Dr. Lacy for removal and replacement of IPG.  He presents today to discuss surgery. some improvements in his falls since Dr. Adams adjusted his DBS at the last appointment. He does continue with some dyskinesias in the right leg. :        Pre-operative evaluation for Procedure(s) (LRB):  REPLACEMENT, BATTERY, DEEP BRAIN STIMULATOR (N/A)    Raul Desai is a 71 y.o. male     Patient Active Problem List   Diagnosis    Parkinson's disease    S/P deep brain stimulator placement    Pseudobulbar affect    Impaired cognition    Spinal stenosis of lumbar region    End of battery life of deep brain stimulator       Review of patient's allergies indicates:   Allergen Reactions    Zolpidem Hallucinations       No current facility-administered medications on file prior to encounter.     Current Outpatient Medications on File Prior to Encounter   Medication Sig Dispense Refill    amantadine HCL (SYMMETREL) 100 mg capsule   See Instructions, TAKE 1 CAPSULE BY MOUTH FOUR TIMES DAILY, # 120 cap(s), 5 Refill(s), Pharmacy: [id number], 172.72, cm, Height/Length Dosing, 10/07/21 7:38:00 CDT, 97.52, kg, Weight Dosing, 10/07/21 7:38:00 CDT      carbidopa-levodopa  mg (SINEMET CR)  mg TbSR Take 1 tablet by mouth nightly.      carbidopa-levodopa  mg (SINEMET)  mg per tablet Take 2 tablets by mouth 4 (four) times daily.      cholecalciferol, vitamin D3, 100 " mcg (4,000 unit) Cap capsule Take 4,000 Units by mouth once daily.      dextromethorphan-quinidine 20-10 mg (NUEDEXTA) 20-10 mg per capsule Take 1 capsule by mouth every 12 (twelve) hours.      gabapentin (NEURONTIN) 100 MG capsule Take 100 mg by mouth every evening.      gabapentin (NEURONTIN) 100 MG capsule Take 100 mg by mouth nightly.      ibuprofen (ADVIL,MOTRIN) 200 MG tablet Take 200 mg by mouth every 6 (six) hours as needed for Pain (STOPPED FOR PROCEDURE).      lisinopriL (PRINIVIL,ZESTRIL) 5 MG tablet Take 5 mg by mouth once daily.      melatonin (MELATIN) 5 mg Take 5 mg by mouth nightly.      MULTIVITAMIN ORAL Take by mouth.      rosuvastatin (CRESTOR) 10 MG tablet Take 1 mg by mouth once daily.         Past Surgical History:   Procedure Laterality Date    ACL repair in left knee  1983    bilateral L2-3, L3-4, L4-5 decompression  05/13/2021    [facility]    Bilateral STN DBS  04/17/2018    [facility]    COLONOSCOPY  11/2020    DEEP BRAIN STIMULATOR PLACEMENT      IPG placement  05/01/2018    [facility]    PROSTATECTOMY         Social History     Socioeconomic History    Marital status:    Tobacco Use    Smoking status: Never Smoker    Smokeless tobacco: Never Used   Substance and Sexual Activity    Alcohol use: Not Currently    Drug use: Never       bLODD TYPE o POS 4 YRS AGO.  CBC6/29/2022: SL LOW h/h=13/40 BETTER THAN 4 YRS AGO.     No results for input(s): WBC, RBC, HGB, HCT, PLT, MCV, MCH, MCHC in the last 72 hours.    CMP 6/29/2022: K=5, Cr=0.93, eGFR: wnl     No results for input(s): NA, K, CL, CO2, BUN, CREATININE, GLU, MG, PHOS, CALCIUM, ALBUMIN, PROT, ALKPHOS, ALT, AST, BILITOT in the last 72 hours.    INR  No results for input(s): PT, INR, PROTIME, APTT in the last 72 hours.        Diagnostic Studies:  CXR 6/29/2022: NAPD    EKG 6/27/2022 : NSR=70, poss ant infarct, age undetermined. No prev.      2D Echo 9/2021 : EF=60-65  %.      No results found for this or any previous  visit..      Pre-op Assessment    I have reviewed the Patient Summary Reports.     I have reviewed the Nursing Notes. I have reviewed the NPO Status.   I have reviewed the Medications.     Review of Systems  Anesthesia Hx:  No problems with previous Anesthesia  History of prior surgery of interest to airway management or planning: Previous anesthesia: General ACL Knee, Lumbar B L2-5 decompression, prostaectomy, deep brain stimulator placement, IPG placement, Bliat STN DBS. with general anesthesia.  Airway issues documented on chart review include GETA  Denies Family Hx of Anesthesia complications.   Denies Personal Hx of Anesthesia complications.   Social:  Non-Smoker, No Alcohol Use    Hematology/Oncology:     Oncology Normal    -- Anemia: Hematology Comments: Chr anemia    EENT/Dental:EENT/Dental Normal   Cardiovascular:   Hypertension Valvular problems/Murmurs, MR, AI Denies MI.    Denies Angina. ECG has been reviewed.    Pulmonary:  Pulmonary Normal    Renal/:  Renal/ Normal     Hepatic/GI:  Hepatic/GI Normal  Denies GERD.    Musculoskeletal:   Arthritis  Lumbar surgery and ablation for pain. Pain resolved. Spine Disorders: lumbar    Neurological:   Chr Parkinsons. Walker. bilateral STN DBS implantation on 04/17/2018.  He underwent placement of IPG for DBS on 05/01/2018. Continues w dyskinesisa R Leg.   Endocrine:  Endocrine Normal    Dermatological:  Skin Normal    Psych:  Psychiatric Normal           Physical Exam  General: Well nourished, Cooperative, Alert and Oriented    Airway:  Mallampati: II / II  Mouth Opening: Normal  TM Distance: Normal  Tongue: Normal  Neck ROM: Normal ROM    Dental:  Intact  Px denies any loose teeth.  Chest/Lungs:  Clear to auscultation, Normal Respiratory Rate    Heart:  Rate: Normal  Rhythm: Regular Rhythm    Abdomen:  Normal, Soft    Musculoskeletal:GCS: Motor: 6/Verbal: 5/Eyes: 4 GCS Total: 15  Mental Status: Awake, Alert, Oriented x3  Sensory: intact to light touch  throughout  Motor Strength:Moves all extremities spontaneously with good tone.  Full strength upper and lower extremities.   Pulses: 2+ and symmetric radial and dorsalis pedis. No lower extremity edema  Gait: walker, freezing        Anesthesia Plan  Type of Anesthesia, risks & benefits discussed:    Anesthesia Type: Gen Supraglottic Airway, Gen ETT  Intra-op Monitoring Plan: Standard ASA Monitors  Post Op Pain Control Plan: multimodal analgesia  Induction:  IV  Airway Plan: Direct  Informed Consent: Informed consent signed with the Patient and all parties understand the risks and agree with anesthesia plan.  All questions answered.   ASA Score: 3  Day of Surgery Review of History & Physical: H&P Update referred to the surgeon/provider.I have interviewed and examined the patient. I have reviewed the patient's H&P dated: 6/27/2022, 3/12/2018.   Anesthesia Plan Notes: May use LMA.    Ready For Surgery From Anesthesia Perspective.     .

## 2022-07-05 NOTE — TRANSFER OF CARE
"Anesthesia Transfer of Care Note    Patient: Raul Desai    Procedure(s) Performed: Procedure(s) (LRB):  REPLACEMENT, BATTERY, DEEP BRAIN STIMULATOR (Right)    Patient location: PACU    Transport from OR: Transported from OR on room air with adequate spontaneous ventilation    Post pain: adequate analgesia    Post assessment: no apparent anesthetic complications    Post vital signs: stable    Level of consciousness: sedated    Nausea/Vomiting: no nausea/vomiting    Complications: none    Transfer of care protocol was followed      Last vitals:   Visit Vitals  BP (!) 149/84   Pulse 69   Temp 36.9 °C (98.5 °F) (Oral)   Resp 18   Ht 5' 8" (1.727 m)   Wt 93 kg (205 lb 0.4 oz)   SpO2 95%   BMI 31.17 kg/m²     "

## 2022-07-05 NOTE — DISCHARGE INSTRUCTIONS
NO driving and NO alcohol consumption for 24 hours and while taking narcotic pain medications.      Keep incisions clean and dry for 48 hours. OK to shower afterwards. Do not tub bathe or submerge incision under water.      NO heavy lifting. DO not lift objects greater than 10lbs. Use caution when lifting, bending, pulling, pushing, etc.     Monitor site for infection: redness, swelling, drainage/pus/foul odor, fever, chills.      Report to your nearest ER AND/OR notify your provider if you experience any SUDDEN/SEVERE chest/abdominal pain, profound weakness, trouble breathing, uncontrolled pain/bleeding.

## 2022-07-05 NOTE — ANESTHESIA POSTPROCEDURE EVALUATION
Anesthesia Post Evaluation    Patient: Raul Desai    Procedure(s) Performed: Procedure(s) (LRB):  REPLACEMENT, BATTERY, DEEP BRAIN STIMULATOR (Right)    Final Anesthesia Type: general      Patient location during evaluation: PACU  Patient participation: Yes- Able to Participate  Level of consciousness: awake and alert, awake and oriented  Post-procedure vital signs: reviewed and stable  Pain management: adequate  Airway patency: patent    PONV status at discharge: No PONV  Anesthetic complications: no      Cardiovascular status: blood pressure returned to baseline, hemodynamically stable and stable  Respiratory status: unassisted, spontaneous ventilation and room air  Hydration status: euvolemic  Follow-up not needed.          Vitals Value Taken Time   /86 07/05/22 0902   Temp 36.5 °C (97.7 °F) 07/05/22 0824   Pulse 60 07/05/22 0902   Resp 17 07/05/22 0854   SpO2 98 % 07/05/22 0902   Vitals shown include unvalidated device data.      Event Time   Out of Recovery 08:54:00         Pain/Akanksha Score: Akanksha Score: 10 (7/5/2022  9:00 AM)

## 2022-07-05 NOTE — BRIEF OP NOTE
[facility] - Periop Services  Brief Operative Note    SUMMARY     Surgery Date: 7/5/2022     Surgeon(s) and Role:     * Azam Lacy MD - Primary    Assisting:  TODD Kessler    Pre-op Diagnosis:  DBS generator at end of life    Post-op Diagnosis:  Post-Op Diagnosis Codes:     * Other mechanical complication of implanted electronic neurostimulator, generator, initial encounter [T85.193A]    Procedure(s) (LRB):  REPLACEMENT, BATTERY, DEEP BRAIN STIMULATOR (Right)   Activa RC    Anesthesia: General    Complications:  none    Estimated Blood Loss: minimal    The patient tolerated the procedure well and was transferred to PACU.

## 2022-07-05 NOTE — INTERVAL H&P NOTE
The patient has been examined and the H&P has been reviewed:    I concur with the findings and no changes have occurred since H&P was written.    Surgery risks, benefits and alternative options discussed and understood by patient/family.

## 2022-07-06 NOTE — OP NOTE
DATE OF OPERATION:  July 5, 2022    PREOPERATIVE DIAGNOSIS  1.  Right chest wall DBS IPG end-of-life  2.  Medically intractable Parkinson's disease    POSTOPERATIVE DIAGNOSIS:  1.  Right chest wall DBS IPG end-of-life  2.  Medically intractable Parkinson's disease    SURGEON:  Azam Lacy M.D.   ASSISTANT: PADMAJA Ravi    PROCEDURE:  1. Removal of right chest wall DBS IPG  2. Insertion of right chest wall DBS IPG ([employer] Activa RC)  3. 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  He underwent bilateral STN DBS electrode implantation in 2018 followed shortly thereafter by right chest wall IPG implantation.  He has done well, but now that the IPG is at end of life he would prefer to have a rechargeable system.  The patient understood and accepted the nature of this surgery as well as its attendant risks.    FINDINGS:  There were no untoward findings.  The IPG was replaced without incident.  Interrogation confirmed normal impedances at all contacts.  The IPG was reprogrammed to the preoperative settings.    PROCEDURE IN DETAIL:  After endotracheal intubation and induction of general anesthesia, the patient's head was placed on a donut and turned away from the planned operative side. The patient received an appropriate dose of intravenous antibiotic prior to the start of procedure. The left anterior chest wall was prepped and draped in the usual fashion. The previous incision was marked out and infiltrated with local anesthetic containing epinephrine. The incision was carried down through the skin and subcutaneous tissues with a knife. Sharp dissection was used to dissect around the existing generator and tie-down sutures. The generator was removed from the pocket and  disconnected from the extension leads. The extension leads were wiped clean and then inserted into the new generator which was placed in the pocket and checked for impedances which were satisfactory. The wound was then irrigated copiously with antibiotic irrigation. The generator was secured to the fascia with a single 3-0 Prolene suture. The wound was then closed without any tension with 2-0 Vicryl for the subcutaneous layer and Dermabond glue for the skin edges. The patient was taken to the post anesthetic care unit in satisfactory condition with correct sponge and needle counts.

## 2022-07-07 NOTE — DISCHARGE SUMMARY
[facility]  Discharge Note  Short Stay    Procedure(s) (LRB):  REPLACEMENT, BATTERY, DEEP BRAIN STIMULATOR (Right)    OUTCOME: Patient tolerated treatment/procedure well without complication and is now ready for discharge.    DISPOSITION: Home or Self Care    FINAL DIAGNOSIS:  DBS IPG at end of life    FOLLOWUP: In clinic    DISCHARGE INSTRUCTIONS:    Discharge Procedure Orders   Diet Adult Regular     No dressing needed     Activity as tolerated         Clinical Reference Documents Added to Patient Instructions       Document    DEEP BRAIN STIMULATION (ENGLISH)    NERVE STIMULATOR TO TREAT CENTRAL SLEEP APNEA (ENGLISH)          TIME SPENT ON DISCHARGE: 5 minutes

## 2022-07-09 LAB
ESTROGEN SERPL-MCNC: NORMAL PG/ML
INSULIN SERPL-ACNC: NORMAL U[IU]/ML
LAB AP CLINICAL INFORMATION: NORMAL
LAB AP GROSS DESCRIPTION: NORMAL
LAB AP REPORT FOOTNOTES: NORMAL
T3RU NFR SERPL: NORMAL %

## 2022-07-25 ENCOUNTER — OFFICE VISIT (OUTPATIENT)
Dept: NEUROSURGERY | Facility: CLINIC | Age: 72
End: 2022-07-25
Payer: MEDICARE

## 2022-07-25 VITALS
DIASTOLIC BLOOD PRESSURE: 87 MMHG | HEART RATE: 77 BPM | BODY MASS INDEX: 32.28 KG/M2 | SYSTOLIC BLOOD PRESSURE: 153 MMHG | RESPIRATION RATE: 18 BRPM | HEIGHT: 68 IN | WEIGHT: 213 LBS

## 2022-07-25 DIAGNOSIS — Z96.89 S/P DEEP BRAIN STIMULATOR PLACEMENT: Primary | ICD-10-CM

## 2022-07-25 DIAGNOSIS — G20.A1 PARKINSON'S DISEASE: ICD-10-CM

## 2022-07-25 PROCEDURE — 99024 POSTOP FOLLOW-UP VISIT: CPT | Mod: POP,,, | Performed by: NEUROLOGICAL SURGERY

## 2022-07-25 PROCEDURE — 99024 PR POST-OP FOLLOW-UP VISIT: ICD-10-PCS | Mod: POP,,, | Performed by: NEUROLOGICAL SURGERY

## 2022-07-25 NOTE — PROGRESS NOTES
[facility]  Neurosurgery        Raul Desai   [id number]   1950         CHIEF COMPLAINT:    3 weeks post-op    HPI:    Raul Desai is a 71 y.o.-year-old male who presents today for post-operative follow-up.  He is s/p removal and replacement of right chest IPG for bilateral DBS that was done on 7/5/22.  He reports some itching at his incision, but otherwise has not had any problems.  He continues to fall frequently.  He is using a walker for stability.  He is scheduled to follow up with Dr. Adams for re-programming in September.        Review of patient's allergies indicates:   Allergen Reactions    Zolpidem Hallucinations       Current Outpatient Medications   Medication Sig Dispense Refill    amantadine HCL (SYMMETREL) 100 mg capsule   See Instructions, TAKE 1 CAPSULE BY MOUTH FOUR TIMES DAILY, # 120 cap(s), 5 Refill(s), Pharmacy: [facility] #[id number], 172.72, cm, Height/Length Dosing, 10/07/21 7:38:00 CDT, 97.52, kg, Weight Dosing, 10/07/21 7:38:00 CDT      carbidopa-levodopa  mg (SINEMET)  mg per tablet Take 2 tablets by mouth 4 (four) times daily.      cholecalciferol, vitamin D3, 100 mcg (4,000 unit) Cap capsule Take 4,000 Units by mouth once daily.      dextromethorphan-quinidine 20-10 mg (NUEDEXTA) 20-10 mg per capsule Take 1 capsule by mouth every 12 (twelve) hours.      gabapentin (NEURONTIN) 100 MG capsule Take 100 mg by mouth nightly.      HYDROcodone-acetaminophen (NORCO) 5-325 mg per tablet Take 1 tablet by mouth every 6 (six) hours as needed for Pain. 28 tablet 0    lisinopriL (PRINIVIL,ZESTRIL) 5 MG tablet Take 5 mg by mouth once daily.      melatonin (MELATIN) 5 mg Take 5 mg by mouth nightly.      MULTIVITAMIN ORAL Take by mouth.      rosuvastatin (CRESTOR) 10 MG tablet Take 1 mg by mouth once daily.      carbidopa-levodopa  mg (SINEMET CR)  mg TbSR Take 1 tablet by mouth nightly.      gabapentin (NEURONTIN) 100 MG  "capsule Take 100 mg by mouth every evening.       No current facility-administered medications for this visit.       Past Medical History:   Diagnosis Date    Facet arthropathy, lumbar     Hyperlipidemia     Hypertension     Low back pain     resolved    Lumbar stenosis with neurogenic claudication     Other intervertebral disc degeneration, lumbar region     Parkinson's disease     Prostate cancer 2003     Past Surgical History:   Procedure Laterality Date    ACL repair in left knee  1983    bilateral L2-3, L3-4, L4-5 decompression  05/13/2021    [facility]    Bilateral ST[facility] DBS  04/17/2018    [facility]    COLONOSCOPY  11/2020    DEEP BRAIN STIMULATOR PLACEMENT      IPG placement  05/01/2018    [facility]    PROSTATECTOMY      REPLACEMENT, BATTERY, DEEP BRAIN STIMULATOR Right 7/5/2022    Procedure: REPLACEMENT, BATTERY, DEEP BRAIN STIMULATOR;  Surgeon: Azam Lacy MD;  Location: [facility] OR;  Service: Neurosurgery;  Laterality: Right;  remove/replace IPG for DBS // DIMITRI / RAY     Family History     Problem Relation (Age of Onset)    Breast cancer Mother, Sister    Colon cancer Mother    Hypertension Father    Osteoporosis Mother        Social History     Socioeconomic History    Marital status:    Tobacco Use    Smoking status: Never Smoker    Smokeless tobacco: Never Used   Substance and Sexual Activity    Alcohol use: Not Currently    Drug use: Never       Review of systems:    Pertinent items are noted in HPI.      Vital Signs  Pulse: 77  Resp: 18  BP: (!) 153/87  Pain Score: 0-No pain  Height: 5' 8" (172.7 cm)  Weight: 96.6 kg (213 lb)  Body mass index is 32.39 kg/m².      Physical Exam:    General:  Pleasant. Well-nourished. Alert. No acute distress.    Head:  Normocephalic, without obvious abnormality, atraumatic    right chest incision:  C/D/I  Wound edges well-approximated  healing well    Lungs:   Breathing is quiet, non-lablored    Neurological:    Oriented to Person, Place, Time "   Speech:  normal  Memory, cognition, and affect are appropriate.  Extraocular movements are intact.  Movements of facial expression are intact and symmetric.    Motor Strength: Moves all extremities spontaneously with good tone.      Gait:  slow, using walker        ASSESSMENT:     1. S/P deep brain stimulator placement    2. Parkinson's disease     - Follow up as needed        I, Dr. Azam Lacy, personally performed the services described in this documentation. All medical record entries made by the scribe, Taryn Treadwell RN, were at my direction and in my presence.  I have reviewed the chart and agree that the record reflects my personal performance and is accurate and complete. Azam Lacy MD.  1:36 PM 07/25/2022           Azam Lacy MD FACS [facility]

## 2022-09-19 ENCOUNTER — OFFICE VISIT (OUTPATIENT)
Dept: NEUROLOGY | Facility: CLINIC | Age: 72
End: 2022-09-19
Payer: MEDICARE

## 2022-09-19 VITALS
WEIGHT: 210 LBS | BODY MASS INDEX: 31.83 KG/M2 | SYSTOLIC BLOOD PRESSURE: 126 MMHG | DIASTOLIC BLOOD PRESSURE: 78 MMHG | HEIGHT: 68 IN

## 2022-09-19 DIAGNOSIS — Z96.89 S/P DEEP BRAIN STIMULATOR PLACEMENT: Chronic | ICD-10-CM

## 2022-09-19 DIAGNOSIS — G20.A1 PARKINSON'S DISEASE: Primary | ICD-10-CM

## 2022-09-19 PROCEDURE — 99214 PR OFFICE/OUTPT VISIT, EST, LEVL IV, 30-39 MIN: ICD-10-PCS | Mod: S$PBB,,, | Performed by: SPECIALIST

## 2022-09-19 PROCEDURE — 99999 PR PBB SHADOW E&M-EST. PATIENT-LVL IV: ICD-10-PCS | Mod: PBBFAC,,, | Performed by: SPECIALIST

## 2022-09-19 PROCEDURE — 99214 OFFICE O/P EST MOD 30 MIN: CPT | Mod: PBBFAC | Performed by: SPECIALIST

## 2022-09-19 PROCEDURE — 99999 PR PBB SHADOW E&M-EST. PATIENT-LVL IV: CPT | Mod: PBBFAC,,, | Performed by: SPECIALIST

## 2022-09-19 PROCEDURE — 99214 OFFICE O/P EST MOD 30 MIN: CPT | Mod: S$PBB,,, | Performed by: SPECIALIST

## 2022-09-19 RX ORDER — PREDNISOLONE ACETATE 10 MG/ML
SUSPENSION/ DROPS OPHTHALMIC
COMMUNITY
Start: 2022-08-24 | End: 2022-12-14

## 2022-09-19 RX ORDER — BROMFENAC SODIUM 0.7 MG/ML
SOLUTION/ DROPS OPHTHALMIC
COMMUNITY
Start: 2022-08-24 | End: 2022-12-14

## 2022-09-19 RX ORDER — ASPIRIN 81 MG/1
81 TABLET ORAL DAILY
COMMUNITY
Start: 2022-09-19 | End: 2024-03-11

## 2022-09-19 NOTE — PROGRESS NOTES
Subjective:         Patient ID: Raul Desai is a 71 y.o. male.    Chief Complaint: PD fu     HPI:           3 month PD, DBS f/u (Here  3 month DBS, PD f/u//Pt reports worsening unbalanced gait due to freezing; use of walker most times for assistance. Some swallowing diff, incr coughing and some choking; new onset of drooling. Cont w Sinemet CR (1 tab qhs) and Sinemet (2 tabs QID), and amantadine 100 mg QID. Dyskinesia to R foot constantly; affecting walking. C/o muscle aching throughout body. )      notes may also be on facesheet for HPI, ROS, and other sections     Review of Systems      Comments from Samantha prior visit(s):  ...          Social History     Socioeconomic History    Marital status:    Tobacco Use    Smoking status: Never    Smokeless tobacco: Never   Substance and Sexual Activity    Alcohol use: Not Currently    Drug use: Never         Current Outpatient Medications:     amantadine HCL (SYMMETREL) 100 mg capsule, TAKE 1 CAPSULE BY MOUTH FOUR TIMES DAILY, Disp: 120 capsule, Rfl: 5    aspirin (ECOTRIN) 81 MG EC tablet, Take 81 mg by mouth once daily., Disp: , Rfl:     carbidopa-levodopa  mg (SINEMET CR)  mg TbSR, Take 1 tablet by mouth nightly., Disp: , Rfl:     carbidopa-levodopa  mg (SINEMET)  mg per tablet, Take 2 tablets by mouth 4 (four) times daily., Disp: , Rfl:     cholecalciferol, vitamin D3, 100 mcg (4,000 unit) Cap capsule, Take 4,000 Units by mouth once daily., Disp: , Rfl:     dextromethorphan-quinidine 20-10 mg (NUEDEXTA) 20-10 mg per capsule, Take 1 capsule by mouth every 12 (twelve) hours., Disp: , Rfl:     gabapentin (NEURONTIN) 100 MG capsule, Take 100 mg by mouth nightly., Disp: , Rfl:     lisinopriL (PRINIVIL,ZESTRIL) 5 MG tablet, Take 5 mg by mouth once daily., Disp: , Rfl:     melatonin (MELATIN) 5 mg, Take 5 mg by mouth nightly., Disp: , Rfl:     MULTIVITAMIN ORAL, Take by mouth., Disp: , Rfl:     prednisoLONE acetate (PRED FORTE) 1 % DrpS,  "Place into both eyes., Disp: , Rfl:     PROLENSA 0.07 % Drop, SMARTSIG:In Eye(s), Disp: , Rfl:     rosuvastatin (CRESTOR) 10 MG tablet, Take 1 mg by mouth once daily., Disp: , Rfl:      Objective:      Exam  /78 (BP Location: Left arm, Patient Position: Sitting)   Ht 5' 8" (1.727 m)   Wt 95.3 kg (210 lb)   BMI 31.93 kg/m²     General:   __unacccompanied       accompanied, by:_    heart__  pharynx:    Neurological  Speech:  cranial nerves:  vis fields:  EOMs:  funduscopic:  motor  coord:   Gait:     Neuroimaging:  Images and imaging reports reviewed.  Study / studies:   Rads summary:  , radiologist  My comments:     Labs:    Sleep study data or PAP adherence data:     meds:      _._ multiple issues/ diagnoses or problems [if not enumerated in note then discussed in encounter but chose to or failed to document]    complexity of data   __high ._mod   __ images and reports reviewed:  __ hx obtained from family or accompaniment:   __other studies reviewed   __studies ordered __   __studies considered or discussed but not ordered __  __DDx discussed __    risks  _._high _mod   _._ neurodegenerative condition expected to progress( possible or definite)   __ autoimmune condition with possibility of flares or unexpected attack( poss or def)    __ seiz d.o. with possib of recurr seiz's (poss or def)      __ cerebrovasc ds with risk of recurrence of stroke  __ potentially high risk meds (and/or) CNS medications which may cause medical or behavioral side effects  _._ fall risk  _._ driving discussed   he has stopped thankfully    discussed UBER     __ diagnosis unclear or DDx wide making risk uncertain to high  __other:    MDM/Medical Decision Making used for CPT choice based on above elements:  __high _. moderate   Spent 35 min with patient and wife NOT counting note generation         Assessment/Plan:       Problem List Items Addressed This Visit          Neuro    Parkinson's disease - Primary (Chronic)    S/P deep " brain stimulator placement (Chronic)       Other comments/ follow up:          No orders of the defined types were placed in this encounter.   No med changes or stimulator changes today   Recently got rechargeable battery and charges every 5 d or so        Aim follow up _3 months; DBS day     Vitaly Adams MD D[facility]GL[facility]

## 2022-12-14 ENCOUNTER — OFFICE VISIT (OUTPATIENT)
Dept: NEUROLOGY | Facility: CLINIC | Age: 72
End: 2022-12-14
Payer: MEDICARE

## 2022-12-14 VITALS
HEIGHT: 68 IN | SYSTOLIC BLOOD PRESSURE: 126 MMHG | BODY MASS INDEX: 31.83 KG/M2 | DIASTOLIC BLOOD PRESSURE: 84 MMHG | WEIGHT: 210 LBS

## 2022-12-14 DIAGNOSIS — G20.A1 PARKINSON'S DISEASE: Primary | Chronic | ICD-10-CM

## 2022-12-14 DIAGNOSIS — Z96.89 S/P DEEP BRAIN STIMULATOR PLACEMENT: Chronic | ICD-10-CM

## 2022-12-14 DIAGNOSIS — R29.6 MULTIPLE FALLS: ICD-10-CM

## 2022-12-14 PROCEDURE — 99213 OFFICE O/P EST LOW 20 MIN: CPT | Mod: PBBFAC | Performed by: SPECIALIST

## 2022-12-14 PROCEDURE — 99214 PR OFFICE/OUTPT VISIT, EST, LEVL IV, 30-39 MIN: ICD-10-PCS | Mod: 25,S$PBB,, | Performed by: SPECIALIST

## 2022-12-14 PROCEDURE — 99999 PR PBB SHADOW E&M-EST. PATIENT-LVL III: CPT | Mod: PBBFAC,,, | Performed by: SPECIALIST

## 2022-12-14 PROCEDURE — 99214 OFFICE O/P EST MOD 30 MIN: CPT | Mod: 25,S$PBB,, | Performed by: SPECIALIST

## 2022-12-14 PROCEDURE — 99999 PR PBB SHADOW E&M-EST. PATIENT-LVL III: ICD-10-PCS | Mod: PBBFAC,,, | Performed by: SPECIALIST

## 2022-12-14 PROCEDURE — 95983 ALYS BRN NPGT PRGRMG 15 MIN: CPT | Mod: S$PBB,,, | Performed by: SPECIALIST

## 2022-12-14 PROCEDURE — 95983 PR ELEC ANALYSIS, IMPLT NEURO PULSE GEN, W/PRGRM, BRAIN, 1ST 15 MINS: ICD-10-PCS | Mod: S$PBB,,, | Performed by: SPECIALIST

## 2022-12-14 PROCEDURE — 95983 ALYS BRN NPGT PRGRMG 15 MIN: CPT | Mod: PBBFAC | Performed by: SPECIALIST

## 2022-12-14 NOTE — PROGRESS NOTES
"Subjective:         Patient ID: Raul Desai is a 72 y.o. male.    Chief Complaint: Parkinson's follow up     HPI:           3 month PD, DBS f/u (Here for 3 month PD, DBS f/u//Pt reports worsening of shuffling/freezing gait; use of walker at all times, incr falls. Fell 7 times on Sunday. Cont w Sinemet CR (1 tab qhs), Sinemet (2 tabs QID), Nudexta once daily, and Amantadine 100 mg QID. Requesting co pay assistance for Nudexta due to out of pocket cost and if unable would like to discuss other options. Currently in [facility] steady boxing 2-3x weekly, helping balance. PT for L knee and R shoulder; legs "giving out" freq)    "He just can't walk and talk" (wife)   'we have bad days everyday'     notes may also be on facesheet for HPI, ROS, and other sections     Review of Systems  Sleep:  RBD: some   Hallucinations:   Depression:   Falls: yes  often           Social History     Socioeconomic History    Marital status:    Tobacco Use    Smoking status: Never    Smokeless tobacco: Never   Substance and Sexual Activity    Alcohol use: Not Currently    Drug use: Never         Current Outpatient Medications:     amantadine HCL (SYMMETREL) 100 mg capsule, TAKE 1 CAPSULE BY MOUTH FOUR TIMES DAILY, Disp: 120 capsule, Rfl: 5    aspirin (ECOTRIN) 81 MG EC tablet, Take 81 mg by mouth once daily., Disp: , Rfl:     carbidopa-levodopa  mg (SINEMET CR)  mg TbSR, Take 1 tablet by mouth nightly., Disp: , Rfl:     carbidopa-levodopa  mg (SINEMET)  mg per tablet, Take 2 tablets by mouth 4 (four) times daily., Disp: , Rfl:     cholecalciferol, vitamin D3, 100 mcg (4,000 unit) Cap capsule, Take 4,000 Units by mouth once daily., Disp: , Rfl:     dextromethorphan-quinidine 20-10 mg (NUEDEXTA) 20-10 mg per capsule, Take 1 capsule by mouth every 12 (twelve) hours., Disp: , Rfl:     gabapentin (NEURONTIN) 100 MG capsule, Take 100 mg by mouth nightly., Disp: , Rfl:     lisinopriL (PRINIVIL,ZESTRIL) 5 MG tablet, " "Take 5 mg by mouth once daily., Disp: , Rfl:     melatonin (MELATIN) 5 mg, Take 5 mg by mouth nightly., Disp: , Rfl:     MULTIVITAMIN ORAL, Take by mouth., Disp: , Rfl:     rosuvastatin (CRESTOR) 10 MG tablet, Take 1 mg by mouth once daily., Disp: , Rfl:      Objective:      Exam  /84 (BP Location: Left arm, Patient Position: Sitting)   Ht 5' 8" (1.727 m)   Wt 95.3 kg (210 lb)   BMI 31.93 kg/m²     General:   If accompanied, by:_ w    heart__  Extremities:     Neurological  Speech: difficult to comprehend   vis fields:  EOMs: ok   motor  coord:   Gait: walker; stutter steps     Tremor: none   Dyskinesias:  none   Bradykinesia: severe     Neuroimaging:  My comments:     Labs:    meds:      DBS, if present:   Settings:   Left unchanged     L STN ..., (-),   ...,   ...  c+  2.7v, 90us, 125Hz     R STN ...,    (-), ...,  ...     2.5v 90us 125Hz   Interleaved with ...,    ...,    (-),    ...  1.4v, 60us, 125Hz   Query/Programming time: 15 min         _._ multiple issues/ diagnoses or problems [if not enumerated in note then discussed in encounter but chose to or failed to document]    complexity of data   __high _. mod   _. _ hx obtained from family or accompaniment:   Old L spine plain films reviewed and they discussed his prior lumbar surgery     risks  _._high _mod   _._ neurodegenerative condition expected to progress( possible or definite)      _._ potentially high risk meds (and/or) CNS medications which may cause medical or behavioral side effects  _._ fall risk  __ driving discussed   __other:      MDM/Medical Decision Making used for CPT choice based on above elements:   _. moderate         Assessment/Plan:       Problem List Items Addressed This Visit          Neuro    Parkinson's disease - Primary (Chronic)    S/P deep brain stimulator placement (Chronic)       Other    Multiple falls       Other comments/ follow up:          Orders Placed This Encounter   Procedures    WHEELCHAIR FOR HOME USE       "       Parkinson's medications can be associated with certain side effects.  Nausea and abdominal symptoms typically improve in time.  Impulse control disorders including persistent thoughts or behaviors involving shopping gambling or sex can be problematic.  Excessive daytime sleepiness including car crashes have been reported.   Delusions hallucinations and paranoia can also occur, typically with higher doses in older patients.   Abrupt stoppage of high dose parkinson's medications can be medically troublesome.          Aim follow up _ months    MD BOBBY DiezA

## 2022-12-30 DIAGNOSIS — F48.2 PSEUDOBULBAR AFFECT: Primary | ICD-10-CM

## 2022-12-30 RX ORDER — DEXTROMETHORPHAN HYDROBROMIDE AND QUINIDINE SULFATE 20; 10 MG/1; MG/1
1 CAPSULE, GELATIN COATED ORAL EVERY 12 HOURS
Qty: 60 CAPSULE | Refills: 11 | Status: SHIPPED | OUTPATIENT
Start: 2022-12-30 | End: 2023-05-15

## 2023-02-15 ENCOUNTER — OFFICE VISIT (OUTPATIENT)
Dept: NEUROLOGY | Facility: CLINIC | Age: 73
End: 2023-02-15
Payer: MEDICARE

## 2023-02-15 VITALS
BODY MASS INDEX: 31.83 KG/M2 | DIASTOLIC BLOOD PRESSURE: 82 MMHG | WEIGHT: 210 LBS | SYSTOLIC BLOOD PRESSURE: 122 MMHG | HEIGHT: 68 IN

## 2023-02-15 DIAGNOSIS — R29.6 MULTIPLE FALLS: ICD-10-CM

## 2023-02-15 DIAGNOSIS — R41.89 IMPAIRED COGNITION: Chronic | ICD-10-CM

## 2023-02-15 DIAGNOSIS — F48.2 PSEUDOBULBAR AFFECT: Chronic | ICD-10-CM

## 2023-02-15 DIAGNOSIS — G20.A1 PARKINSON'S DISEASE: Primary | Chronic | ICD-10-CM

## 2023-02-15 DIAGNOSIS — Z96.89 S/P DEEP BRAIN STIMULATOR PLACEMENT: Chronic | ICD-10-CM

## 2023-02-15 PROCEDURE — 99999 PR PBB SHADOW E&M-EST. PATIENT-LVL III: CPT | Mod: PBBFAC,,, | Performed by: SPECIALIST

## 2023-02-15 PROCEDURE — 99215 OFFICE O/P EST HI 40 MIN: CPT | Mod: S$PBB,,, | Performed by: SPECIALIST

## 2023-02-15 PROCEDURE — 99215 PR OFFICE/OUTPT VISIT, EST, LEVL V, 40-54 MIN: ICD-10-PCS | Mod: S$PBB,,, | Performed by: SPECIALIST

## 2023-02-15 PROCEDURE — 99213 OFFICE O/P EST LOW 20 MIN: CPT | Mod: PBBFAC | Performed by: SPECIALIST

## 2023-02-15 PROCEDURE — 99999 PR PBB SHADOW E&M-EST. PATIENT-LVL III: ICD-10-PCS | Mod: PBBFAC,,, | Performed by: SPECIALIST

## 2023-02-15 RX ORDER — RIVASTIGMINE TARTRATE 1.5 MG/1
CAPSULE ORAL
Qty: 21 CAPSULE | Refills: 0 | Status: SHIPPED | OUTPATIENT
Start: 2023-02-15 | End: 2023-05-15

## 2023-02-15 RX ORDER — RIVASTIGMINE TARTRATE 4.5 MG/1
4.5 CAPSULE ORAL 2 TIMES DAILY
Qty: 60 CAPSULE | Refills: 11 | Status: SHIPPED | OUTPATIENT
Start: 2023-03-01 | End: 2023-12-04

## 2023-02-15 NOTE — PROGRESS NOTES
Subjective:         Patient ID: Raul Desai is a 72 y.o. male.    Chief Complaint: Parkinson's follow up     HPI:           2 month DBS f/u (Here for 2 month DBS f/u//Pt reports incr in gait difficulties since last visit. Incr in pain to L knee, currently in PT twice weekly and [facility] boxing, use of walker at all times; falls almost daily. Swallowing diff, drooling; denies hallucinations. Dystonia to R foot. Cont w Sinemet CR (1 tab qhs), Sinemet (2 tabs QID) and Amantadine 100 mg QID; would like poss dose incr. Had to stop Nuedexta due to cost; was very helpful for his symptoms, incr in crying spells since stopping. )      notes may also be on facesheet for HPI, ROS, and other sections     Review of Systems  Sleep:  RBD: rare no injury    Hallucinations: none  Depression:   Falls: often            Social History     Socioeconomic History    Marital status:    Tobacco Use    Smoking status: Never    Smokeless tobacco: Never   Substance and Sexual Activity    Alcohol use: Not Currently    Drug use: Never         Current Outpatient Medications:     amantadine HCL (SYMMETREL) 100 mg capsule, TAKE 1 CAPSULE BY MOUTH FOUR TIMES DAILY, Disp: 120 capsule, Rfl: 5    aspirin (ECOTRIN) 81 MG EC tablet, Take 81 mg by mouth once daily., Disp: , Rfl:     carbidopa-levodopa  mg (SINEMET CR)  mg TbSR, Take 1 tablet by mouth nightly., Disp: , Rfl:     carbidopa-levodopa  mg (SINEMET)  mg per tablet, Take 2 tablets by mouth 4 (four) times daily., Disp: , Rfl:     cholecalciferol, vitamin D3, 100 mcg (4,000 unit) Cap capsule, Take 4,000 Units by mouth once daily., Disp: , Rfl:     gabapentin (NEURONTIN) 100 MG capsule, Take 100 mg by mouth nightly., Disp: , Rfl:     lisinopriL (PRINIVIL,ZESTRIL) 5 MG tablet, Take 5 mg by mouth once daily., Disp: , Rfl:     melatonin (MELATIN) 5 mg, Take 5 mg by mouth nightly., Disp: , Rfl:     MULTIVITAMIN ORAL, Take by mouth., Disp: , Rfl:     rosuvastatin  "(CRESTOR) 10 MG tablet, Take 1 mg by mouth once daily., Disp: , Rfl:     Objective:      Exam  /82 (BP Location: Left arm, Patient Position: Sitting)   Ht 5' 8" (1.727 m)   Wt 95.3 kg (210 lb)   BMI 31.93 kg/m²     General:   If accompanied, by:_ w  heart__  Extremities:     Neurological  Speech: so dysarthric trouble understanding hiim   vis fields:   EOMs: ok   Motor: no lateralizing or focal def's   coord:   Gait: walker     Tremor: none   Dyskinesias:  none   Bradykinesia: mild     Neuroimaging:  My comments:     Labs:    meds:      DBS,  present:  but I did not even query today       _._ multiple issues/ diagnoses or problems [if not enumerated in note then discussed in encounter but chose to or failed to document]    complexity of data   _._high _mod   __ images and reports reviewed:  _._ hx obtained from family or accompaniment:   __studies ordered __   __studies considered or discussed but not ordered __    risks  _._high _mod   _._ neurodegenerative condition expected to progress( possible or definite)      _._ potentially high risk meds (and/or) CNS medications which may cause medical or behavioral side effects  _._ fall risk  _._ driving discussed   __other: I encourage her to trigger LTC policy to give her some relief       MDM/Medical Decision Making used for CPT choice based on above elements:  _._high _moderate         Assessment/Plan:       Problem List Items Addressed This Visit          Neuro    Parkinson's disease - Primary (Chronic)    S/P deep brain stimulator placement (Chronic)    Impaired cognition (Chronic)       Psychiatric    Pseudobulbar affect (Chronic)       Other    Multiple falls       Other comments/ follow up:          No orders of the defined types were placed in this encounter.     Medications Ordered This Encounter   Medications    rivastigmine tartrate (EXELON) 1.5 MG capsule     Sig: One bid first week then two twice per day one week then increase to the 4.5mg bid     " Dispense:  21 capsule     Refill:  0    rivastigmine tartrate 4.5 mg capsule     Sig: Take 1 capsule (4.5 mg total) by mouth 2 (two) times daily.     Dispense:  60 capsule     Refill:  11       Parkinson's medications can be associated with certain side effects.  Nausea and abdominal symptoms typically improve in time.  Impulse control disorders including persistent thoughts or behaviors involving shopping gambling or sex can be problematic.  Excessive daytime sleepiness including car crashes have been reported.   Delusions hallucinations and paranoia can also occur, typically with higher doses in older patients.   Abrupt stoppage of high dose parkinson's medications can be medically troublesome.          Aim follow up _3 months    Vitaly Adams MD [facility]

## 2023-02-21 DIAGNOSIS — G20.A1 PARKINSON'S DISEASE: Primary | ICD-10-CM

## 2023-02-22 RX ORDER — AMANTADINE HYDROCHLORIDE 100 MG/1
CAPSULE, GELATIN COATED ORAL
Qty: 120 CAPSULE | Refills: 5 | Status: SHIPPED | OUTPATIENT
Start: 2023-02-22 | End: 2023-08-08 | Stop reason: SDUPTHER

## 2023-03-30 ENCOUNTER — TELEPHONE (OUTPATIENT)
Dept: NEUROSURGERY | Facility: CLINIC | Age: 73
End: 2023-03-30
Payer: MEDICARE

## 2023-05-08 NOTE — TELEPHONE ENCOUNTER
Impression: Presence of intraocular lens: Z96.1.  Bilateral. s/p YAG OD Plan: Continue to observe Received paperwork for patient (Long Term Care Determination). We have not seen him since 7/2022 and he is prn. He is actively seeing Dr. Adams. I sent the paperwork to his office.

## 2023-05-15 ENCOUNTER — OFFICE VISIT (OUTPATIENT)
Dept: NEUROLOGY | Facility: CLINIC | Age: 73
End: 2023-05-15
Payer: MEDICARE

## 2023-05-15 VITALS
SYSTOLIC BLOOD PRESSURE: 118 MMHG | BODY MASS INDEX: 31.83 KG/M2 | WEIGHT: 210 LBS | HEIGHT: 68 IN | DIASTOLIC BLOOD PRESSURE: 82 MMHG

## 2023-05-15 DIAGNOSIS — R29.6 MULTIPLE FALLS: ICD-10-CM

## 2023-05-15 DIAGNOSIS — G20.A1 PARKINSON'S DISEASE: Primary | Chronic | ICD-10-CM

## 2023-05-15 DIAGNOSIS — Z96.89 S/P DEEP BRAIN STIMULATOR PLACEMENT: Chronic | ICD-10-CM

## 2023-05-15 DIAGNOSIS — F48.2 PSEUDOBULBAR AFFECT: Chronic | ICD-10-CM

## 2023-05-15 PROCEDURE — 99999 PR PBB SHADOW E&M-EST. PATIENT-LVL III: ICD-10-PCS | Mod: PBBFAC,,, | Performed by: SPECIALIST

## 2023-05-15 PROCEDURE — 99215 PR OFFICE/OUTPT VISIT, EST, LEVL V, 40-54 MIN: ICD-10-PCS | Mod: S$PBB,,, | Performed by: SPECIALIST

## 2023-05-15 PROCEDURE — 99213 OFFICE O/P EST LOW 20 MIN: CPT | Mod: PBBFAC | Performed by: SPECIALIST

## 2023-05-15 PROCEDURE — 99999 PR PBB SHADOW E&M-EST. PATIENT-LVL III: CPT | Mod: PBBFAC,,, | Performed by: SPECIALIST

## 2023-05-15 PROCEDURE — 99215 OFFICE O/P EST HI 40 MIN: CPT | Mod: S$PBB,,, | Performed by: SPECIALIST

## 2023-05-15 RX ORDER — ESCITALOPRAM OXALATE 10 MG/1
10 TABLET ORAL DAILY
Qty: 90 TABLET | Refills: 3 | Status: SHIPPED | OUTPATIENT
Start: 2023-05-15 | End: 2024-05-14

## 2023-05-15 NOTE — PROGRESS NOTES
"Subjective:         Patient ID: Raul Desai is a 72 y.o. male.    Chief Complaint: PD follow up     HPI:           3 months PD f/u (Here for 3 month PD, DBS f/u//Pt reports tremors well controlled w medications; taking Sinemet CR  mg (2 tab qhs), Sinemet  mg (2 tabs QID), Rivastigmine 4.5 mg BID, and Amantadine 100 mg QID. Unsteady gait unchanged; reports recent falls and diff with pivoting when walking, freezing/shuffling gait. Some swallowing diff and drooling. C/o vivid dreams and talking in sleep. Not much improvement in symptoms since starting Rivastigmine. 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     Current Outpatient Medications   Medication Instructions    amantadine HCL (SYMMETREL) 100 mg capsule TAKE 1 CAPSULE BY MOUTH FOUR TIMES DAILY    aspirin (ECOTRIN) 81 mg, Oral, Daily    carbidopa-levodopa  mg (SINEMET CR)  mg TbSR TAKE 1 TO 2 TABLETS BY MOUTH AT BEDTIME    carbidopa-levodopa  mg (SINEMET)  mg per tablet 2 tablets, Oral, 4 times daily    cholecalciferol (vitamin D3) 4,000 Units, Oral, Daily    EScitalopram oxalate (LEXAPRO) 10 mg, Oral, Daily    gabapentin (NEURONTIN) 100 mg, Oral, Nightly    lisinopriL (PRINIVIL,ZESTRIL) 5 mg, Oral, Daily    melatonin (MELATIN) 5 mg, Oral, Nightly    MULTIVITAMIN ORAL Oral    rivastigmine tartrate 4.5 mg, Oral, 2 times daily    rosuvastatin (CRESTOR) 1 mg, Oral, Daily         Objective:      Exam  /82 (BP Location: Left arm, Patient Position: Sitting)   Ht 5' 8" (1.727 m)   Wt 95.3 kg (210 lb)   BMI 31.93 kg/m² "     General:   If accompanied, by:_ w  Heart__ reg  Extremities:     Neurological  Speech: dysarthric   Cried when she spoke of ltc insurance   vis fields:  EOMs: ok   Motor:   coord:   Gait: walker     Tremor: none   Bradykinesia: mod    Dyskinesias: none     Neuroimaging:  My comments:     Labs:    meds:    DBS, if present:   Settings:   Programming time:  __ 15 min   __ addn 15 min     _;_ multiple issues/ diagnoses or problems [if not enumerated in note then discussed in encounter but chose to or failed to document]    complexity of data   _;_high _mod   __ images and reports reviewed:  __ hx obtained from family or accompaniment:   __studies ordered __   __studies considered or discussed but not ordered __    risks  _;_high _mod   _._ neurodegen condition expected to progress( poss or defin)   _._ potentially high risk meds (and/or) CNS medications which may cause medical or behavioral se's  _._ fall risk  _;_ driving discussed no longer drives   __other:      MDM/Medical Decision Making used for CPT choice based on above elements:  _;_high _moderate     Parkinson's medications can be associated with certain side effects.  Nausea and abdominal symptoms typically improve in time.  Impulse control disorders including persistent thoughts or behaviors involving shopping gambling or sex can be problematic.  Excessive daytime sleepiness including car crashes have been reported.   Delusions hallucinations and paranoia can also occur, typically with higher doses in older patients.   Abrupt stoppage of high dose parkinson's medications can be medically troublesome.          Assessment/Plan:       Problem List Items Addressed This Visit          Neuro    Parkinson's disease - Primary (Chronic)    S/P deep brain stimulator placement (Chronic)       Psychiatric    Pseudobulbar affect (Chronic)       Other    Multiple falls       Other comments/ follow up:        No orders of the defined types were placed in this  encounter.     Medications Ordered This Encounter   Medications    EScitalopram oxalate (LEXAPRO) 10 MG tablet     Sig: Take 1 tablet (10 mg total) by mouth once daily.     Dispense:  90 tablet     Refill:  3       Aim follow up _ months    MD BOBBY DiezA

## 2023-05-18 ENCOUNTER — TELEPHONE (OUTPATIENT)
Dept: NEUROLOGY | Facility: CLINIC | Age: 73
End: 2023-05-18
Payer: MEDICARE

## 2023-05-18 DIAGNOSIS — G20.A1 PARKINSON'S DISEASE: Primary | ICD-10-CM

## 2023-05-18 NOTE — TELEPHONE ENCOUNTER
"Patient states when picking up Lexapro, there were "8 pages of side effects" related to the medication, one being hallucinations. He has some concerns and would like to make sure medication is safe to take.     Patient also wants to clarify time medication should be taken, morning or night?     Phone: [phone]   "

## 2023-05-19 RX ORDER — CARBIDOPA AND LEVODOPA 25; 100 MG/1; MG/1
2 TABLET ORAL 4 TIMES DAILY
Qty: 180 TABLET | Refills: 2 | Status: SHIPPED | OUTPATIENT
Start: 2023-05-19 | End: 2023-05-24

## 2023-05-19 NOTE — TELEPHONE ENCOUNTER
D/w pt; states he feels better now, and concerns are less after discussing; encouraged pt that if he feels he does not tolerate, to notify the office.  However, if he does tolerate, that it can take 4-6 wks to achieve desired effect

## 2023-05-24 DIAGNOSIS — G20.A1 PARKINSON'S DISEASE: ICD-10-CM

## 2023-05-24 RX ORDER — CARBIDOPA AND LEVODOPA 25; 100 MG/1; MG/1
TABLET ORAL
Qty: 736 TABLET | Refills: 2 | Status: SHIPPED | OUTPATIENT
Start: 2023-05-24

## 2023-08-08 DIAGNOSIS — G20.A1 PARKINSON'S DISEASE: ICD-10-CM

## 2023-08-08 RX ORDER — AMANTADINE HYDROCHLORIDE 100 MG/1
100 CAPSULE, GELATIN COATED ORAL 4 TIMES DAILY
Qty: 120 CAPSULE | Refills: 5 | Status: SHIPPED | OUTPATIENT
Start: 2023-08-08 | End: 2023-08-24 | Stop reason: SDUPTHER

## 2023-08-24 DIAGNOSIS — G20.A1 PARKINSON'S DISEASE: Chronic | ICD-10-CM

## 2023-08-24 RX ORDER — CARBIDOPA AND LEVODOPA 25; 100 MG/1; MG/1
TABLET, EXTENDED RELEASE ORAL
Qty: 180 TABLET | Refills: 2 | Status: SHIPPED | OUTPATIENT
Start: 2023-08-24

## 2023-08-24 RX ORDER — AMANTADINE HYDROCHLORIDE 100 MG/1
100 CAPSULE, GELATIN COATED ORAL 4 TIMES DAILY
Qty: 360 CAPSULE | Refills: 3 | Status: SHIPPED | OUTPATIENT
Start: 2023-08-24 | End: 2024-08-23

## 2023-10-30 ENCOUNTER — OFFICE VISIT (OUTPATIENT)
Dept: NEUROLOGY | Facility: CLINIC | Age: 73
End: 2023-10-30
Payer: MEDICARE

## 2023-10-30 ENCOUNTER — TELEPHONE (OUTPATIENT)
Dept: NEUROLOGY | Facility: CLINIC | Age: 73
End: 2023-10-30
Payer: MEDICARE

## 2023-10-30 VITALS
HEIGHT: 68 IN | SYSTOLIC BLOOD PRESSURE: 188 MMHG | DIASTOLIC BLOOD PRESSURE: 110 MMHG | WEIGHT: 210 LBS | BODY MASS INDEX: 31.83 KG/M2

## 2023-10-30 DIAGNOSIS — Z96.89 S/P DEEP BRAIN STIMULATOR PLACEMENT: Chronic | ICD-10-CM

## 2023-10-30 DIAGNOSIS — M48.062 SPINAL STENOSIS OF LUMBAR REGION WITH NEUROGENIC CLAUDICATION: Chronic | ICD-10-CM

## 2023-10-30 DIAGNOSIS — G20.B2 PARKINSON'S DISEASE WITH DYSKINESIA AND FLUCTUATING MANIFESTATIONS: Chronic | ICD-10-CM

## 2023-10-30 DIAGNOSIS — F02.80 DEMENTIA DUE TO PARKINSON'S DISEASE, UNSPECIFIED DEMENTIA SEVERITY, UNSPECIFIED WHETHER BEHAVIORAL, PSYCHOTIC, OR MOOD DISTURBANCE OR ANXIETY: ICD-10-CM

## 2023-10-30 DIAGNOSIS — M79.604 RIGHT LEG PAIN: Primary | ICD-10-CM

## 2023-10-30 DIAGNOSIS — G20.A1 DEMENTIA DUE TO PARKINSON'S DISEASE, UNSPECIFIED DEMENTIA SEVERITY, UNSPECIFIED WHETHER BEHAVIORAL, PSYCHOTIC, OR MOOD DISTURBANCE OR ANXIETY: ICD-10-CM

## 2023-10-30 PROCEDURE — 99214 PR OFFICE/OUTPT VISIT, EST, LEVL IV, 30-39 MIN: ICD-10-PCS | Mod: S$PBB,,, | Performed by: SPECIALIST

## 2023-10-30 PROCEDURE — 99999 PR PBB SHADOW E&M-EST. PATIENT-LVL III: ICD-10-PCS | Mod: PBBFAC,,, | Performed by: SPECIALIST

## 2023-10-30 PROCEDURE — 99213 OFFICE O/P EST LOW 20 MIN: CPT | Mod: PBBFAC | Performed by: SPECIALIST

## 2023-10-30 PROCEDURE — 99999 PR PBB SHADOW E&M-EST. PATIENT-LVL III: CPT | Mod: PBBFAC,,, | Performed by: SPECIALIST

## 2023-10-30 PROCEDURE — 99214 OFFICE O/P EST MOD 30 MIN: CPT | Mod: S$PBB,,, | Performed by: SPECIALIST

## 2023-10-30 RX ORDER — NAPROXEN 500 MG/1
500 TABLET ORAL 2 TIMES DAILY PRN
COMMUNITY
Start: 2023-05-31 | End: 2024-03-11

## 2023-10-30 RX ORDER — GABAPENTIN 300 MG/1
300 CAPSULE ORAL 3 TIMES DAILY
Qty: 90 CAPSULE | Refills: 11 | Status: SHIPPED | OUTPATIENT
Start: 2023-10-30 | End: 2024-10-29

## 2023-10-30 RX ORDER — IBUPROFEN 200 MG
400 TABLET ORAL EVERY 6 HOURS PRN
COMMUNITY

## 2023-10-30 RX ORDER — ACETAMINOPHEN 325 MG/1
650 TABLET ORAL EVERY 6 HOURS PRN
COMMUNITY

## 2023-10-30 RX ORDER — PREDNISONE 10 MG/1
TABLET ORAL
Qty: 42 TABLET | Refills: 0 | Status: SHIPPED | OUTPATIENT
Start: 2023-10-30 | End: 2024-03-11

## 2023-10-30 NOTE — TELEPHONE ENCOUNTER
Had cancellation at 10.      Contacted [facility] to [facility]. Patient is available  will come in today to discuss with Dr. Adams

## 2023-10-30 NOTE — PROGRESS NOTES
Subjective:         Patient ID: Raul Desai is a 73 y.o. male.    Chief Complaint: R LE pain for the last one week / may have stretched something in rehab     HPI:           Called to come in for RLE pain   Hx lumbar surgery mario 5/2021    F/U PD-DBS.    Pts wife is here today. Pt states he has a sharp-stabbing pain  it rt leg on and off and it is worse when he moves his foot. States he has had this for abt 2 wks. States he is doing PT and it is making the pain worse.     Not sleeping well and worse if lying flat     notes may also be on facesheet for HPI, ROS, and other sections     ROS:             Social History     Tobacco Use    Smoking status: Never    Smokeless tobacco: Never   Substance Use Topics    Alcohol use: Not Currently    Drug use: Never      Lives with ___ wife   __Drives   _;_Does not drive   __Working   Retired:  DDS     Current Outpatient Medications   Medication Instructions    acetaminophen (TYLENOL) 650 mg, Oral, Every 6 hours PRN    amantadine HCL (SYMMETREL) 100 mg, Oral, 4 times daily    aspirin (ECOTRIN) 81 mg, Oral, Daily    carbidopa-levodopa  mg (SINEMET CR)  mg TbSR TAKE 1 TO 2 TABLETS BY MOUTH AT BEDTIME    carbidopa-levodopa  mg (SINEMET)  mg per tablet TAKE 2 TABLETS BY MOUTH FOUR TIMES DAILY    cholecalciferol (vitamin D3) 4,000 Units, Oral, Daily    EScitalopram oxalate (LEXAPRO) 10 mg, Oral, Daily    gabapentin (NEURONTIN) 100 mg, Oral, Nightly    gabapentin (NEURONTIN) 300 mg, Oral, 3 times daily    ibuprofen (ADVIL,MOTRIN) 400 mg, Oral, Every 6 hours PRN    lisinopriL (PRINIVIL,ZESTRIL) 5 mg, Oral, Daily    melatonin (MELATIN) 10 mg, Oral, Nightly    MULTIVITAMIN ORAL Oral    naproxen (NAPROSYN) 500 mg, Oral, 2 times daily PRN    predniSONE (DELTASONE) 10 MG tablet 3 tabs daily one week two daily the next then one daily the third week then off    rivastigmine tartrate 4.5 mg, Oral, 2 times daily    rosuvastatin (CRESTOR) 1 mg, Oral, Daily     "    Objective:      Exam  BP (!) 188/110   Ht 5' 8" (1.727 m)   Wt 95.3 kg (210 lb)   BMI 31.93 kg/m²     General:   [] Unaccompanied   [x] Accompanied, by__  heart:   pharynx:    Neurological []nl  []Abnml:     Speech:  [] [x]  vis fields:  [] []  EOMs:  [] []  funduscopic: [] []  Motor:   [] []  Reluctant to move RLE due to pain but can move in all directions   coord:   [] []  Gait:   [] [x]  Wheelchair     Neuroimaging:  [x]Images and imaging reports reviewed.  My comments: old L mri 1/2021 reviewed with greatest prob in mid lumbar region; plain films from years ago reviewed also     Labs:      [x]  Multiple Issues/ diagnoses or problems  [if not enumerated in note then discussed but not documented]    Complexity of Data:   [x] High    [] Moderate   [x] Images and reports reviewed  [] Other studies reviewed [x] History obtained from accompaniment [] Differential Diagnoses discussed   [] Studies considered/ discussed but not ordered [] Studies ordered     Risks:   [] High     [] Moderate   [] (poss or definite) neurodegenerative condition [] () autoimmune condition with possibility of flares or unexpected attack  [] () seiz d.o. with possib of recurr seiz's  [] Cerebrovasc ds with risk of recurrent stroke  [] CNS meds (and/or) potentially high risk non CNS meds taken or discussed which may cause med or behav SE's  [x] Fall risk [] Driving discussed  [] Diagnosis unclear or DDx wide making risk uncertain   []:    MDM:    [] High     [x] Moderate           Assessment/Plan:         ICD-10-CM ICD-9-CM   1. Right leg pain  M79.604 729.5   2. Spinal stenosis of lumbar region with neurogenic claudication  M48.062 724.03   3. Parkinson's disease with dyskinesia and fluctuating manifestations  G20.B2 332.0   4. S/P deep brain stimulator placement  Z96.89 V45.89   5. Dementia due to Parkinson's disease, unspecified dementia severity, unspecified whether behavioral, psychotic, or mood disturbance or anxiety  G20.A1 332.0 "    F02.80 294.10         Other comments/ follow up:        Imaging orders (if any):   Med orders/refills, if any:   Medications Ordered This Encounter   Medications    gabapentin (NEURONTIN) 300 MG capsule     Sig: Take 1 capsule (300 mg total) by mouth 3 (three) times daily.     Dispense:  90 capsule     Refill:  11    predniSONE (DELTASONE) 10 MG tablet     Sig: 3 tabs daily one week two daily the next then one daily the third week then off     Dispense:  42 tablet     Refill:  0         No follow-ups on file.         Vitaly Adams MD [facility] FAAN FAASM

## 2023-10-30 NOTE — TELEPHONE ENCOUNTER
Patient reports pain in R leg. Describes pain as severe stabbing pain. Unsure if this could be due to DBS. Also contacted PCP, but asking for appt today or recommendations.     Patient taking 2 ibuprofen and tylenol together every 6 hours, that helps for some time but pain returns. Well laying down, pain is unbearable.     Phone: [phone]

## 2023-11-13 ENCOUNTER — OFFICE VISIT (OUTPATIENT)
Dept: NEUROLOGY | Facility: CLINIC | Age: 73
End: 2023-11-13
Payer: MEDICARE

## 2023-11-13 VITALS
SYSTOLIC BLOOD PRESSURE: 118 MMHG | DIASTOLIC BLOOD PRESSURE: 86 MMHG | WEIGHT: 210 LBS | BODY MASS INDEX: 32.96 KG/M2 | HEIGHT: 67 IN

## 2023-11-13 DIAGNOSIS — Z96.89 S/P DEEP BRAIN STIMULATOR PLACEMENT: Chronic | ICD-10-CM

## 2023-11-13 DIAGNOSIS — G20.A2 PARKINSON'S DISEASE WITHOUT DYSKINESIA, WITH FLUCTUATING MANIFESTATIONS: Primary | Chronic | ICD-10-CM

## 2023-11-13 PROCEDURE — 99999 PR PBB SHADOW E&M-EST. PATIENT-LVL IV: ICD-10-PCS | Mod: PBBFAC,,, | Performed by: SPECIALIST

## 2023-11-13 PROCEDURE — 99999 PR PBB SHADOW E&M-EST. PATIENT-LVL IV: CPT | Mod: PBBFAC,,, | Performed by: SPECIALIST

## 2023-11-13 PROCEDURE — 99214 OFFICE O/P EST MOD 30 MIN: CPT | Mod: PBBFAC | Performed by: SPECIALIST

## 2023-11-13 PROCEDURE — 99213 OFFICE O/P EST LOW 20 MIN: CPT | Mod: S$PBB,,, | Performed by: SPECIALIST

## 2023-11-13 PROCEDURE — 99213 PR OFFICE/OUTPT VISIT, EST, LEVL III, 20-29 MIN: ICD-10-PCS | Mod: S$PBB,,, | Performed by: SPECIALIST

## 2023-11-13 NOTE — PROGRESS NOTES
Subjective:         Patient ID: Raul Desai is a 73 y.o. male.    Chief Complaint: PD follow up     HPI:           2 week PD, DBS f/u (Here for 2 week PD, DBS f/u//Pt reports no tremors; aching pain to legs has improved significantly since medication (GBP and prednisone) .   Unsteady gait unchanged; using wheelchair or walker 99% of time at time, most recent fall 1 week ago.   Taking Prednisone 10 mg daily, this is his last week.   Taking Gabapentin 300 mg TID,  Amantadine 100 mg QID,   Sinemet CR  mg (2 tabs QID)   and 1-2 tabs qhs,   Rivastigmine 4.5 mg BID.       notes may also be on facesheet for HPI, ROS, and other sections     Neurological ROS:   Falls:  Hallucinations:   RBD:   Sleep:        Social History     Socioeconomic History    Marital status:    Tobacco Use    Smoking status: Never    Smokeless tobacco: Never   Substance and Sexual Activity    Alcohol use: Not Currently    Drug use: Never     Working as:   Last worked as:     Current Outpatient Medications   Medication Instructions    acetaminophen (TYLENOL) 650 mg, Oral, Every 6 hours PRN    amantadine HCL (SYMMETREL) 100 mg, Oral, 4 times daily    aspirin (ECOTRIN) 81 mg, Oral, Daily    carbidopa-levodopa  mg (SINEMET CR)  mg TbSR TAKE 1 TO 2 TABLETS BY MOUTH AT BEDTIME    carbidopa-levodopa  mg (SINEMET)  mg per tablet TAKE 2 TABLETS BY MOUTH FOUR TIMES DAILY    cholecalciferol (vitamin D3) 4,000 Units, Oral, Daily    EScitalopram oxalate (LEXAPRO) 10 mg, Oral, Daily    gabapentin (NEURONTIN) 300 mg, Oral, 3 times daily    ibuprofen (ADVIL,MOTRIN) 400 mg, Oral, Every 6 hours PRN    lisinopriL (PRINIVIL,ZESTRIL) 5 mg, Oral, Daily    melatonin (MELATIN) 10 mg, Oral, Nightly    MULTIVITAMIN ORAL Oral    naproxen (NAPROSYN) 500 mg, Oral, 2 times daily PRN    predniSONE (DELTASONE) 10 MG tablet 3 tabs daily one week two daily the next then one daily the third week then off    rivastigmine tartrate 4.5 mg,  "Oral, 2 times daily    rosuvastatin (CRESTOR) 1 mg, Oral, Daily         Objective:      Exam  /86 (BP Location: Left arm, Patient Position: Sitting)   Ht 5' 7" (1.702 m)   Wt 95.3 kg (210 lb)   BMI 32.89 kg/m²     General:   [] Unaccompanied   [x] Accompanied, by__w  heart: rrr  pharynx:    Neurological    Speech: Difficult to comprehend   EOMs: Nl   Gait:  Wchair     Tremor:       [x]None    Bradykinesia: [x]moderate  Dyskinesias: [x]None       Neuroimaging:  []Images and imaging reports reviewed.  My comments:     Labs:    meds:    DBS, if present:   Not queried   wife recharges every 3 d       Assessment/Plan:         ICD-10-CM ICD-9-CM   1. Parkinson's disease without dyskinesia, with fluctuating manifestations  G20.A2 332.0   2. S/P deep brain stimulator placement  Z96.89 V45.89       Other comments/ follow up:        Stay on gbp after steroid done next week   Fingers crossed and prayers pain stays away   Aim revisit 4 mos     Vitaly Adams MD [facility]    "

## 2023-12-02 DIAGNOSIS — G20.A1 PARKINSON'S DISEASE: Chronic | ICD-10-CM

## 2023-12-04 RX ORDER — RIVASTIGMINE TARTRATE 4.5 MG/1
4.5 CAPSULE ORAL 2 TIMES DAILY
Qty: 180 CAPSULE | Refills: 1 | Status: SHIPPED | OUTPATIENT
Start: 2023-12-04 | End: 2024-03-11

## 2024-03-11 ENCOUNTER — OFFICE VISIT (OUTPATIENT)
Dept: NEUROLOGY | Facility: CLINIC | Age: 74
End: 2024-03-11
Payer: MEDICARE

## 2024-03-11 VITALS
HEIGHT: 67 IN | BODY MASS INDEX: 33.74 KG/M2 | SYSTOLIC BLOOD PRESSURE: 124 MMHG | DIASTOLIC BLOOD PRESSURE: 84 MMHG | WEIGHT: 215 LBS

## 2024-03-11 DIAGNOSIS — F02.80 DEMENTIA DUE TO PARKINSON'S DISEASE, UNSPECIFIED DEMENTIA SEVERITY, UNSPECIFIED WHETHER BEHAVIORAL, PSYCHOTIC, OR MOOD DISTURBANCE OR ANXIETY: ICD-10-CM

## 2024-03-11 DIAGNOSIS — Z96.89 S/P DEEP BRAIN STIMULATOR PLACEMENT: ICD-10-CM

## 2024-03-11 DIAGNOSIS — G20.A1 DEMENTIA DUE TO PARKINSON'S DISEASE, UNSPECIFIED DEMENTIA SEVERITY, UNSPECIFIED WHETHER BEHAVIORAL, PSYCHOTIC, OR MOOD DISTURBANCE OR ANXIETY: ICD-10-CM

## 2024-03-11 DIAGNOSIS — G20.A2 PARKINSON'S DISEASE WITHOUT DYSKINESIA, WITH FLUCTUATING MANIFESTATIONS: Primary | ICD-10-CM

## 2024-03-11 PROCEDURE — 99999 PR PBB SHADOW E&M-EST. PATIENT-LVL IV: CPT | Mod: PBBFAC,,, | Performed by: NURSE PRACTITIONER

## 2024-03-11 PROCEDURE — 99214 OFFICE O/P EST MOD 30 MIN: CPT | Mod: 25,S$PBB,, | Performed by: NURSE PRACTITIONER

## 2024-03-11 PROCEDURE — 95970 ALYS NPGT W/O PRGRMG: CPT | Mod: PBBFAC | Performed by: NURSE PRACTITIONER

## 2024-03-11 PROCEDURE — 95970 ALYS NPGT W/O PRGRMG: CPT | Mod: S$PBB,,, | Performed by: NURSE PRACTITIONER

## 2024-03-11 PROCEDURE — 99214 OFFICE O/P EST MOD 30 MIN: CPT | Mod: PBBFAC,25 | Performed by: NURSE PRACTITIONER

## 2024-03-11 NOTE — PROGRESS NOTES
"  Neurology Follow up Note    Subjective:         Patient ID: Raul Desai is a 73 y.o. male.    Chief Complaint: 6 month PD, DBS f/u     HPI:            Worsening unsteady gait, use of wheelchair at times, "leans to left 90% of the time"; currently in PT.     Walks w assistance at all times. Recent falls; freezing worse to L leg.     Denies tremor, hallucinations; mild swallowing diff and incr drooling.     +worsening dexterity in both hands; drops things    Taking Gabapentin 300 mg TID - no longer experiencing R thigh pain     Amantadine 100 mg QID, Sinemet IR  (2 tabs QID) and Sinemet CR 25/100mg (1-2 tabs qhs), and Rivastigmine 4.5 mg BID.     Feels like he needs to clear throat often; had swallow study done few months ago, results normal.    He denies neck pain, numbness, or tingling    He has prostrate issues and has urinary incontinence    No notable difference since starting [employer].      ROS: as per HPI, otherwise pertinent systems review is negative          Past Medical History:   Diagnosis Date    Facet arthropathy, lumbar     Hyperlipidemia     Hypertension     Low back pain     resolved    Lumbar stenosis with neurogenic claudication     Other intervertebral disc degeneration, lumbar region     Parkinson's disease     Prostate cancer 2003       Past Surgical History:   Procedure Laterality Date    ACL repair in left knee  1983    bilateral L2-3, L3-4, L4-5 decompression  05/13/2021    [facility]    Bilateral STN DBS  04/17/2018    [facility]    COLONOSCOPY  11/2020    DEEP BRAIN STIMULATOR PLACEMENT      FETAL RADIO FREQUENCY ABLATION      IPG placement  05/01/2018    [facility]    PROSTATECTOMY      REPLACEMENT, BATTERY, DEEP BRAIN STIMULATOR Right 07/05/2022    Procedure: REPLACEMENT, BATTERY, DEEP BRAIN STIMULATOR;  Surgeon: Azam Lacy MD;  Location: [facility];  Service: Neurosurgery;  Laterality: Right;  remove/replace IPG for DBS // DIMITRI / RAY       Family History   Problem Relation Age of " "Onset    Osteoporosis Mother     Colon cancer Mother     Breast cancer Mother     Hypertension Father     Breast cancer Sister        Social History     Socioeconomic History    Marital status:    Tobacco Use    Smoking status: Never    Smokeless tobacco: Never   Substance and Sexual Activity    Alcohol use: Not Currently    Drug use: Never       Review of patient's allergies indicates:   Allergen Reactions    Zolpidem Hallucinations       Current Outpatient Medications   Medication Instructions    acetaminophen (TYLENOL) 650 mg, Oral, Every 6 hours PRN    amantadine HCL (SYMMETREL) 100 mg, Oral, 4 times daily    carbidopa-levodopa  mg (SINEMET CR)  mg TbSR TAKE 1 TO 2 TABLETS BY MOUTH AT BEDTIME    carbidopa-levodopa  mg (SINEMET)  mg per tablet TAKE 2 TABLETS BY MOUTH FOUR TIMES DAILY    cholecalciferol (vitamin D3) 4,000 Units, Oral, Daily    EScitalopram oxalate (LEXAPRO) 10 mg, Oral, Daily    gabapentin (NEURONTIN) 300 mg, Oral, 3 times daily    ibuprofen (ADVIL,MOTRIN) 400 mg, Oral, Every 6 hours PRN    lisinopriL (PRINIVIL,ZESTRIL) 5 mg, Oral, Daily    melatonin (MELATIN) 10 mg, Oral, Nightly    MULTIVITAMIN ORAL Oral    rivastigmine tartrate 4.5 mg, Oral, 2 times daily    rosuvastatin (CRESTOR) 1 mg, Oral, Daily       Objective:      Exam:   Visit Vitals  /84 (BP Location: Left arm, Patient Position: Sitting)   Ht 5' 7" (1.702 m)   Wt 97.5 kg (215 lb)   BMI 33.67 kg/m²       Physical Exam  Vitals reviewed.   Constitutional:       Appearance: Parkinsonian; WC and he is leaning to the left     Accompanied by: wife  HENT:      Ears:      Comments: Hearing normal.  Eyes:      Extraocular Movements: Extraocular movements intact.      VF's ok  Cardiovascular:      Rate and Rhythm: Normal rate and regular rhythm.   Pulmonary:      Effort: Pulmonary effort is normal.      Breath sounds: Normal breath sounds.   Musculoskeletal:         General: Normal range of motion.   Skin:     " General: Skin is warm and dry.   Neurological:      General: No focal deficit present.      Mental Status: alert and oriented to person, place, and time.      Speech: dysarthric/hypophonia     Face: symmetric; tongue midline     Motor: nonlateralizing; 5/5 and equal; B DF 5/5, B hip 5/5;      pincers/finger spread/wrist ext 5/5; raises arms above his head     Coordination: F to N ok, no dysmetria     Tone: +R cogwheel rigidity      Tremor: trace resting R hand      Gait: WC  Psychiatric:         Mood and Affect: Mood normal.         Behavior: Behavior normal.         Assessment/Plan:   1. Dementia due to Parkinson's disease, unspecified dementia severity, unspecified whether behavioral, psychotic, or mood disturbance or anxiety  Stop the Rivastigmine    2. Parkinson's disease without dyskinesia, with fluctuating manifestations  Continue present management    OT eval and treat [dexterity]    Reminded patient/family about potential PD med side effects, which include but not limited to impulse control disorders (ex: pathologic gambling, shopping, or preoccupation with sexual thoughts or behaviors), excessive daytime sleepiness, hallucinations and sleep attacks. Discussed that driving may pose an increased risk to patients on these medications. Hypotension is also occasionally associated with Parkinson's medications. Any of these complications should be reported to the prescribing physician or provider so that appropriate further modifications can occur.    Fall precautions discussed      3. S/p DBS  Interrogated 10 minutes; no changes  Moi 100%  Impedance ok      Follow up in about 3 months (around 6/11/2024), or if symptoms worsen or fail to improve, for Parkinson's Disease.      Daniel Cid, MSN, APRN, AGACNP-BC

## 2024-03-26 ENCOUNTER — PATIENT MESSAGE (OUTPATIENT)
Dept: NEUROLOGY | Facility: CLINIC | Age: 74
End: 2024-03-26
Payer: MEDICARE

## 2024-03-26 DIAGNOSIS — F02.80 DEMENTIA DUE TO PARKINSON'S DISEASE, UNSPECIFIED DEMENTIA SEVERITY, UNSPECIFIED WHETHER BEHAVIORAL, PSYCHOTIC, OR MOOD DISTURBANCE OR ANXIETY: Primary | ICD-10-CM

## 2024-03-26 DIAGNOSIS — G20.A1 DEMENTIA DUE TO PARKINSON'S DISEASE, UNSPECIFIED DEMENTIA SEVERITY, UNSPECIFIED WHETHER BEHAVIORAL, PSYCHOTIC, OR MOOD DISTURBANCE OR ANXIETY: Primary | ICD-10-CM

## 2024-03-28 RX ORDER — DONEPEZIL HYDROCHLORIDE 10 MG/1
TABLET, FILM COATED ORAL
Qty: 30 TABLET | Refills: 6 | Status: SHIPPED | OUTPATIENT
Start: 2024-03-28

## 2024-04-11 DIAGNOSIS — G20.A1 PARKINSON'S DISEASE: ICD-10-CM

## 2024-04-12 RX ORDER — CARBIDOPA AND LEVODOPA 25; 100 MG/1; MG/1
TABLET ORAL
Qty: 736 TABLET | Refills: 2 | Status: SHIPPED | OUTPATIENT
Start: 2024-04-12

## 2024-04-22 DIAGNOSIS — F48.2 PSEUDOBULBAR AFFECT: Chronic | ICD-10-CM

## 2024-04-22 DIAGNOSIS — G20.A1 PARKINSON'S DISEASE: Chronic | ICD-10-CM

## 2024-04-22 RX ORDER — ESCITALOPRAM OXALATE 10 MG/1
10 TABLET ORAL
Qty: 90 TABLET | Refills: 3 | Status: SHIPPED | OUTPATIENT
Start: 2024-04-22

## 2024-04-23 DIAGNOSIS — G20.A1 PARKINSON'S DISEASE: Chronic | ICD-10-CM

## 2024-04-23 RX ORDER — AMANTADINE HYDROCHLORIDE 100 MG/1
CAPSULE, GELATIN COATED ORAL
Qty: 120 CAPSULE | Refills: 2 | Status: SHIPPED | OUTPATIENT
Start: 2024-04-23

## 2024-06-18 ENCOUNTER — OFFICE VISIT (OUTPATIENT)
Dept: NEUROLOGY | Facility: CLINIC | Age: 74
End: 2024-06-18
Payer: MEDICARE

## 2024-06-18 VITALS
HEIGHT: 67 IN | SYSTOLIC BLOOD PRESSURE: 158 MMHG | BODY MASS INDEX: 33.74 KG/M2 | WEIGHT: 215 LBS | DIASTOLIC BLOOD PRESSURE: 86 MMHG

## 2024-06-18 DIAGNOSIS — Z96.89 S/P DEEP BRAIN STIMULATOR PLACEMENT: ICD-10-CM

## 2024-06-18 DIAGNOSIS — R26.89 ANTALGIC GAIT: ICD-10-CM

## 2024-06-18 DIAGNOSIS — M25.562 CHRONIC PAIN OF LEFT KNEE: ICD-10-CM
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
## 2024-06-18 DIAGNOSIS — R41.3 OTHER AMNESIA: ICD-10-CM

## 2024-06-18 DIAGNOSIS — M25.561 CHRONIC PAIN OF RIGHT KNEE: Primary | ICD-10-CM

## 2024-06-18 DIAGNOSIS — G89.29 CHRONIC PAIN OF LEFT KNEE: ICD-10-CM

## 2024-06-18 DIAGNOSIS — M17.5 OTHER SECONDARY OSTEOARTHRITIS OF LEFT KNEE: ICD-10-CM

## 2024-06-18 DIAGNOSIS — G20.A2 PARKINSON'S DISEASE WITHOUT DYSKINESIA, WITH FLUCTUATING MANIFESTATIONS: ICD-10-CM

## 2024-06-18 DIAGNOSIS — G45.9 TRANSIENT CEREBRAL ISCHEMIA, UNSPECIFIED TYPE: ICD-10-CM

## 2024-06-18 DIAGNOSIS — G89.29 CHRONIC PAIN OF RIGHT KNEE: Primary | ICD-10-CM

## 2024-06-18 PROBLEM — M17.12 OSTEOARTHRITIS OF LEFT KNEE: Status: ACTIVE | Noted: 2024-06-18

## 2024-06-18 PROCEDURE — 99215 OFFICE O/P EST HI 40 MIN: CPT | Mod: 25,S$PBB,, | Performed by: NURSE PRACTITIONER

## 2024-06-18 PROCEDURE — 95983 ALYS BRN NPGT PRGRMG 15 MIN: CPT | Mod: PBBFAC | Performed by: NURSE PRACTITIONER

## 2024-06-18 PROCEDURE — 99214 OFFICE O/P EST MOD 30 MIN: CPT | Mod: PBBFAC,25 | Performed by: NURSE PRACTITIONER

## 2024-06-18 PROCEDURE — 99999 PR PBB SHADOW E&M-EST. PATIENT-LVL IV: CPT | Mod: PBBFAC,,, | Performed by: NURSE PRACTITIONER

## 2024-06-18 PROCEDURE — 95983 ALYS BRN NPGT PRGRMG 15 MIN: CPT | Mod: S$PBB,,, | Performed by: NURSE PRACTITIONER

## 2024-06-18 NOTE — PROGRESS NOTES
Neurology Follow up Note    Subjective:         Patient ID: Raul Desai is a 73 y.o. male.    Chief Complaint: F/U PD-DBS.    HPI:            Denies any tremors.     Has Freezing-shuffled gait w falls. L knee pain     +Diff w speech and drooling. Will start ST    Denies diff w swallowing or hallucinations.     Sleeping well w vivid dreams. RBD 2-3 x per month - takes Melatonin 10 mg at bed time    Does not drive, lives at home w his wife and needs some asst w putting on pants an showering.    Pt is in a wheelchair today.    Doing [facility] 2-3x/ wk and starts ST next week.     Intermittent diplopia  Fell this morning getting out of the shower - hit L temp area of head       ROS: as per HPI, otherwise pertinent systems review is negative          Past Medical History:   Diagnosis Date    Facet arthropathy, lumbar     Hyperlipidemia     Hypertension     Low back pain     resolved    Lumbar stenosis with neurogenic claudication     Other intervertebral disc degeneration, lumbar region     Parkinson's disease     Prostate cancer 2003       Past Surgical History:   Procedure Laterality Date    ACL repair in left knee  1983    bilateral L2-3, L3-4, L4-5 decompression  05/13/2021    [facility]    Bilateral STN DBS  04/17/2018    [facility]    COLONOSCOPY  11/2020    DEEP BRAIN STIMULATOR PLACEMENT      FETAL RADIO FREQUENCY ABLATION      IPG placement  05/01/2018    [facility]    PROSTATECTOMY      REPLACEMENT, BATTERY, DEEP BRAIN STIMULATOR Right 07/05/2022    Procedure: REPLACEMENT, BATTERY, DEEP BRAIN STIMULATOR;  Surgeon: Azam Lacy MD;  Location: [facility];  Service: Neurosurgery;  Laterality: Right;  remove/replace IPG for DBS // DIMITRI / RAY       Family History   Problem Relation Name Age of Onset    Osteoporosis Mother      Colon cancer Mother      Breast cancer Mother      Hypertension Father      Breast cancer Sister         Social History     Socioeconomic History    Marital status:    Tobacco Use     "Smoking status: Never    Smokeless tobacco: Never   Substance and Sexual Activity    Alcohol use: Not Currently    Drug use: Never       Review of patient's allergies indicates:   Allergen Reactions    Zolpidem Hallucinations       Current Outpatient Medications   Medication Instructions    amantadine HCL (SYMMETREL) 100 mg capsule TAKE 1 CAPSULE(100 MG) BY MOUTH FOUR TIMES DAILY    carbidopa-levodopa  mg (SINEMET CR)  mg TbSR TAKE 1 TO 2 TABLETS BY MOUTH AT BEDTIME    carbidopa-levodopa  mg (SINEMET)  mg per tablet TAKE 2 TABLET BY MOUTH FOUR TIMES DAILY    cholecalciferol (vitamin D3) 4,000 Units, Oral, Daily    donepeziL (ARICEPT) 10 MG tablet 0.5 tab every morning for 4 weeks then 1 tab daily thereafter    EScitalopram oxalate (LEXAPRO) 10 mg, Oral    gabapentin (NEURONTIN) 300 mg, Oral, 3 times daily    lisinopriL (PRINIVIL,ZESTRIL) 5 mg, Oral, Daily    melatonin (MELATIN) 10 mg, Oral, Nightly    MULTIVITAMIN ORAL Oral    rosuvastatin (CRESTOR) 1 mg, Oral, Daily       Objective:      Exam:   Visit Vitals  BP (!) 158/86   Ht 5' 7" (1.702 m)   Wt 97.5 kg (215 lb)   BMI 33.67 kg/m²       Physical Exam  Vitals reviewed.   Constitutional:       Appearance: Parkinsonian; WC and he is leaning to the left     Accompanied by: wife  HENT:      Ears:      Comments: Hearing normal.  Eyes:      Extraocular Movements: Extraocular movements intact. ? L ptosis     VF's ok  Cardiovascular:      Rate and Rhythm: Normal rate and regular rhythm.   Pulmonary:      Effort: Pulmonary effort is normal.      Breath sounds: Normal breath sounds.   Musculoskeletal:         General: Normal range of motion.   Skin:     General: Skin is warm and dry.   Neurological:      General: No focal deficit present.      Mental Status: alert and oriented to person, place, and time.      Speech: dysarthric/hypophonia     Face: symmetric; tongue midline; ? L facial droop      Motor: nonlateralizing; 5/5 and equal; B DF 5/5, B hip " 5/5;      pincers/finger spread/wrist ext 5/5; raises arms above his head     Coordination: F to N ok, no dysmetria     Tone: no cogwheel rigidity     Tremor: trace resting R hand and RLE     Gait: WC  Psychiatric:         Mood and Affect: Mood normal.         Behavior: Behavior normal.         Assessment/Plan:   PD  S/P DBS   Interrogated today - 10 minutes - one change made - see scanned in facesheet from today for specifics  Impedance ok  Moi 100% [rechargeable]      Continue the CD/LD IR 2 tabs QID and CD/LD CR 1 at bed time   Continue the Amantadine 100 mg four times per day     Reminded patient/family about potential PD med side effects, which include but not limited to impulse control disorders (ex: pathologic gambling, shopping, or preoccupation with sexual thoughts or behaviors), excessive daytime sleepiness, hallucinations and sleep attacks. Discussed that driving may pose an increased risk to patients on these medications. Hypotension is also occasionally associated with Parkinson's medications. Any of these complications should be reported to the prescribing physician or provider so that appropriate further modifications can occur.    Fall precautions discussed    Driving discussed      Antalgic gait  - Ambulatory referral/consult to Orthopedics; Future  - X-Ray Knee 3 View Left; Future    Other secondary osteoarthritis of left knee  - Ambulatory referral/consult to Orthopedics; Future  - X-Ray Knee 3 View Left; Future    Other amnesia  - CT Head Without Contrast; Future    Transient cerebral ischemia, unspecified type    - CT Head Without Contrast; Future    Fall  CT head    Chronic pain of left knee  - X-Ray Knee 3 View Left; Future      Follow up in about 3 months (around 9/18/2024), or if symptoms worsen or fail to improve, for Parkinson's Disease.      Daniel Cid, MSN, APRN, AGACNP-BC

## 2024-06-27 ENCOUNTER — OFFICE VISIT (OUTPATIENT)
Dept: ORTHOPEDICS | Facility: CLINIC | Age: 74
End: 2024-06-27
Payer: MEDICARE

## 2024-06-27 ENCOUNTER — HOSPITAL ENCOUNTER (OUTPATIENT)
Dept: RADIOLOGY | Facility: CLINIC | Age: 74
Discharge: HOME | End: 2024-06-27
Attending: PHYSICIAN ASSISTANT
Payer: MEDICARE

## 2024-06-27 VITALS
WEIGHT: 214.94 LBS | DIASTOLIC BLOOD PRESSURE: 76 MMHG | HEIGHT: 67 IN | BODY MASS INDEX: 33.74 KG/M2 | SYSTOLIC BLOOD PRESSURE: 138 MMHG | HEART RATE: 63 BPM

## 2024-06-27 DIAGNOSIS — M17.5 OTHER SECONDARY OSTEOARTHRITIS OF LEFT KNEE: ICD-10-CM

## 2024-06-27 DIAGNOSIS — M25.561 CHRONIC PAIN OF RIGHT KNEE: ICD-10-CM

## 2024-06-27 DIAGNOSIS — M17.12 PRIMARY OSTEOARTHRITIS OF LEFT KNEE: ICD-10-CM

## 2024-06-27 DIAGNOSIS — R26.89 ANTALGIC GAIT: ICD-10-CM

## 2024-06-27 DIAGNOSIS — M17.5 OTHER SECONDARY OSTEOARTHRITIS OF LEFT KNEE: Primary | ICD-10-CM

## 2024-06-27 DIAGNOSIS — G89.29 CHRONIC PAIN OF RIGHT KNEE: ICD-10-CM

## 2024-06-27 RX ORDER — LIDOCAINE HYDROCHLORIDE 20 MG/ML
2 INJECTION, SOLUTION INFILTRATION; PERINEURAL
Status: DISCONTINUED | OUTPATIENT
Start: 2024-06-27 | End: 2024-06-27 | Stop reason: HOSPADM

## 2024-06-27 RX ORDER — METHYLPREDNISOLONE ACETATE 80 MG/ML
80 INJECTION, SUSPENSION INTRA-ARTICULAR; INTRALESIONAL; INTRAMUSCULAR; SOFT TISSUE
Status: DISCONTINUED | OUTPATIENT
Start: 2024-06-27 | End: 2024-06-27 | Stop reason: HOSPADM

## 2024-06-27 RX ADMIN — LIDOCAINE HYDROCHLORIDE 2 MG: 20 INJECTION, SOLUTION INFILTRATION; PERINEURAL at 09:06

## 2024-06-27 RX ADMIN — METHYLPREDNISOLONE ACETATE 80 MG: 80 INJECTION, SUSPENSION INTRA-ARTICULAR; INTRALESIONAL; INTRAMUSCULAR; SOFT TISSUE at 09:06

## 2024-06-27 NOTE — PROGRESS NOTES
Chief Complaint:   Chief Complaint   Patient presents with    Knee Pain     L knee pain is constant does have hx of surgery about 40 years ago has started PT 2x a week for at least a year which he does find helpful but he is scared to use it to much as he feels it will give out on him. His right leg does not come off the ground and he does not trust his left so he is present in a wheelchair today.        History of present illness:    This is a 73 y.o. year old male who complains of left knee pain.    Patient does have a history of an ACL reconstruction done 40 years ago and a subsequent scar tissue removal a few years ago.    Patient does have a complicated history as he is a Parkinson's patient and has undergone a deep brain stimulator as well and he was going to physical therapy right now in his right leg has a 1 being affected by his Parkinson's more but he was left leg which is the 1 giving him the pain in the discomfort in the previous surgery is when he was relying on but he does not have trust for it presently and he has been using a wheelchair more as a result.    Wheelchair is mainly so that he does not fall and hit his head which they want him avoid since his deep brain stimulator implantation      Current Outpatient Medications   Medication Sig    amantadine HCL (SYMMETREL) 100 mg capsule TAKE 1 CAPSULE(100 MG) BY MOUTH FOUR TIMES DAILY    carbidopa-levodopa  mg (SINEMET CR)  mg TbSR TAKE 1 TO 2 TABLETS BY MOUTH AT BEDTIME (Patient taking differently: 1 tablet nightly. TAKE 1 TO 2 TABLETS BY MOUTH AT BEDTIME)    carbidopa-levodopa  mg (SINEMET)  mg per tablet TAKE 2 TABLET BY MOUTH FOUR TIMES DAILY    cholecalciferol, vitamin D3, 100 mcg (4,000 unit) Cap capsule Take 4,000 Units by mouth once daily.    donepeziL (ARICEPT) 10 MG tablet 0.5 tab every morning for 4 weeks then 1 tab daily thereafter (Patient taking differently: 10 mg once daily. 0.5 tab every morning for 4 weeks then  "1 tab daily thereafter)    EScitalopram oxalate (LEXAPRO) 10 MG tablet TAKE 1 TABLET(10 MG) BY MOUTH EVERY DAY    gabapentin (NEURONTIN) 300 MG capsule Take 1 capsule (300 mg total) by mouth 3 (three) times daily.    lisinopriL (PRINIVIL,ZESTRIL) 5 MG tablet Take 5 mg by mouth once daily.    melatonin (MELATIN) 5 mg Take 10 mg by mouth nightly.    MULTIVITAMIN ORAL Take by mouth.    rosuvastatin (CRESTOR) 10 MG tablet Take 1 mg by mouth once daily.     No current facility-administered medications for this visit.       Review of Systems:    Constitution:   Denies chills, fever, and sweats.  HENT:   Denies headaches or blurry vision.  Cardiovascular:  Denies chest pain or irregular heart beat.  Respiratory:   Denies cough or shortness of breath.  Gastrointestinal:  Denies abdominal pain, nausea, or vomiting.  Musculoskeletal:   Denies muscle cramps.  Neurological:   Denies dizziness or focal weakness.  Psychiatric/Behavior: Normal mental status.  Hematology/Lymph:  Denies bleeding problem or easy bruising/bleeding.  Skin:    Denies rash or suspicious lesions.    Examination:    Vital Signs:    Vitals:    06/27/24 0927   BP: 138/76   Pulse: 63   Weight: 97.5 kg (214 lb 15.2 oz)   Height: 5' 7" (1.702 m)       Body mass index is 33.67 kg/m².    Constitution:   Well-developed, well nourished patient in no acute distress.  Neurological:   Alert and oriented x 3 and cooperative to examination.     Psychiatric/Behavior: Normal mental status.  Respiratory:   No shortness of breath.  Eyes:    Extraoccular muscles intact  Skin:    No scars, rash or suspicious lesions.    Physical Exam:   Left Knee     Moderate effusion    No erythema, warmth bruising     Multiple healed surgical scars from previous surgeries    active flexion 120   active extension 5    Tender over medial joint line  Tender over MCL   No lateral compartment tenderness   Negative  Maurice test   Negative  lachman test   No instability on varus or valgus " stress  Significant varus deformity        Imaging: X-rays ordered and images interpreted today personally by me of left knee two views.    Patient does have extensive arthritic changes of the left knee with bone-on-bone contact of the medial and lateral compartments with subluxation.    He does have intact hardware from previous ACL reconstruction.    Hypertrophic osteophyte formation and sclerosis noted        Assessment: Other secondary osteoarthritis of left knee  -     Cancel: X-Ray Knee Complete 4 or More Views Left; Future; Expected date: 06/27/2024  -     Ambulatory referral/consult to Orthopedics    Chronic pain of right knee  -     Ambulatory referral/consult to Orthopedics    Antalgic gait  -     Ambulatory referral/consult to Orthopedics    Primary osteoarthritis of left knee  -     Large Joint Aspiration/Injection: L knee         Plan:  At this point had long discussion with the patient that we can aspirate of the knee to pull the fluid out and gave him some cortisone to help with his left knee pain.    They are fully aware that this may not alleviate the mistrust symptoms that he was having with his leg in his Parkinson's disease.    Recommend the patient possibly try a hinged brace which he states he does have an home but he has not worn for quite some time.    After the injection the patient states that his knee does feel better and has less pressure.    We will follow up in 3 or 4 months for repeat injection for pain as indicated and the patient will call with a hinged brace he has a home he has not fitting adequately and we will send her a script to have another 1 fitted for him at  orth[facility]    Large Joint Aspiration/Injection: L knee    Date/Time: 6/27/2024 9:15 AM    Performed by: Titi Figueroa PA-C  Authorized by: Titi Figueroa PA-C    Consent Done?:  Yes (Verbal)  Indications:  Arthritis  Timeout: prior to procedure the correct patient, procedure, and site was verified     Prep: patient was prepped and draped in usual sterile fashion      Local anesthesia used?: Yes    Local anesthetic:  Lidocaine 2% without epinephrine    Details:  Needle Size:  25 G  Ultrasonic Guidance for needle placement?: No    Location:  Knee  Site:  L knee  Medications:  2 mg LIDOcaine HCL 20 mg/ml (2%) 20 mg/mL (2 %); 80 mg methylPREDNISolone acetate 80 mg/mL  Aspirate amount (mL):  20  Aspirate:  Clear and yellow  Patient tolerance:  Patient tolerated the procedure well with no immediate complications       DISCLAIMER: This note may have been dictated using voice recognition software and may contain grammatical errors.     NOTE: Consult report sent to referring provider via [facility] EMR.

## 2024-06-27 NOTE — PROCEDURES
Large Joint Aspiration/Injection: L knee    Date/Time: 6/27/2024 9:15 AM    Performed by: Titi Figueroa PA-C  Authorized by: Titi Figueroa PA-C    Consent Done?:  Yes (Verbal)  Indications:  Arthritis  Timeout: prior to procedure the correct patient, procedure, and site was verified    Prep: patient was prepped and draped in usual sterile fashion      Local anesthesia used?: Yes    Local anesthetic:  Lidocaine 2% without epinephrine    Details:  Needle Size:  25 G  Ultrasonic Guidance for needle placement?: No    Location:  Knee  Site:  L knee  Medications:  2 mg LIDOcaine HCL 20 mg/ml (2%) 20 mg/mL (2 %); 80 mg methylPREDNISolone acetate 80 mg/mL  Aspirate amount (mL):  20  Aspirate:  Clear and yellow  Patient tolerance:  Patient tolerated the procedure well with no immediate complications

## 2024-06-28 ENCOUNTER — PATIENT MESSAGE (OUTPATIENT)
Dept: NEUROLOGY | Facility: CLINIC | Age: 74
End: 2024-06-28
Payer: MEDICARE

## 2024-06-28 ENCOUNTER — HOSPITAL ENCOUNTER (OUTPATIENT)
Dept: RADIOLOGY | Facility: HOSPITAL | Age: 74
Discharge: HOME | End: 2024-06-28
Attending: NURSE PRACTITIONER
Payer: MEDICARE

## 2024-06-28 DIAGNOSIS — G45.9 TRANSIENT CEREBRAL ISCHEMIA, UNSPECIFIED TYPE: ICD-10-CM

## 2024-06-28 DIAGNOSIS — R41.3 OTHER AMNESIA: ICD-10-CM

## 2024-06-28 PROCEDURE — 70450 CT HEAD/BRAIN W/O DYE: CPT | Mod: TC

## 2024-07-03 ENCOUNTER — PATIENT MESSAGE (OUTPATIENT)
Dept: NEUROLOGY | Facility: CLINIC | Age: 74
End: 2024-07-03
Payer: MEDICARE

## 2024-08-15 ENCOUNTER — PATIENT MESSAGE (OUTPATIENT)
Dept: NEUROLOGY | Facility: CLINIC | Age: 74
End: 2024-08-15
Payer: MEDICARE

## 2024-08-16 NOTE — TELEPHONE ENCOUNTER
11am F to F tues 8.20   or if prefer virtual visit then 1145 virtual that day please     If tues inconven for them then a late day virtual another day could work not mon though

## 2024-09-03 DIAGNOSIS — F02.80 DEMENTIA DUE TO PARKINSON'S DISEASE, UNSPECIFIED DEMENTIA SEVERITY, UNSPECIFIED WHETHER BEHAVIORAL, PSYCHOTIC, OR MOOD DISTURBANCE OR ANXIETY: Primary | ICD-10-CM

## 2024-09-03 DIAGNOSIS — G20.A1 DEMENTIA DUE TO PARKINSON'S DISEASE, UNSPECIFIED DEMENTIA SEVERITY, UNSPECIFIED WHETHER BEHAVIORAL, PSYCHOTIC, OR MOOD DISTURBANCE OR ANXIETY: Primary | ICD-10-CM

## 2024-09-04 RX ORDER — DONEPEZIL HYDROCHLORIDE 10 MG/1
TABLET, FILM COATED ORAL
Qty: 90 TABLET | Refills: 3 | Status: SHIPPED | OUTPATIENT
Start: 2024-09-04

## 2024-09-24 ENCOUNTER — OFFICE VISIT (OUTPATIENT)
Dept: NEUROLOGY | Facility: CLINIC | Age: 74
End: 2024-09-24
Payer: MEDICARE

## 2024-09-24 VITALS
SYSTOLIC BLOOD PRESSURE: 160 MMHG | WEIGHT: 215 LBS | HEIGHT: 67 IN | BODY MASS INDEX: 33.74 KG/M2 | DIASTOLIC BLOOD PRESSURE: 82 MMHG

## 2024-09-24 DIAGNOSIS — G20.A1 DEMENTIA DUE TO PARKINSON'S DISEASE, UNSPECIFIED DEMENTIA SEVERITY, UNSPECIFIED WHETHER BEHAVIORAL, PSYCHOTIC, OR MOOD DISTURBANCE OR ANXIETY: ICD-10-CM

## 2024-09-24 DIAGNOSIS — G20.B2 PARKINSON'S DISEASE WITH DYSKINESIA AND FLUCTUATING MANIFESTATIONS: Primary | ICD-10-CM

## 2024-09-24 DIAGNOSIS — M48.062 SPINAL STENOSIS OF LUMBAR REGION WITH NEUROGENIC CLAUDICATION: Chronic | ICD-10-CM

## 2024-09-24 DIAGNOSIS — R29.6 MULTIPLE FALLS: ICD-10-CM

## 2024-09-24 DIAGNOSIS — Z96.89 S/P DEEP BRAIN STIMULATOR PLACEMENT: ICD-10-CM

## 2024-09-24 DIAGNOSIS — M79.604 RIGHT LEG PAIN: ICD-10-CM

## 2024-09-24 DIAGNOSIS — F02.80 DEMENTIA DUE TO PARKINSON'S DISEASE, UNSPECIFIED DEMENTIA SEVERITY, UNSPECIFIED WHETHER BEHAVIORAL, PSYCHOTIC, OR MOOD DISTURBANCE OR ANXIETY: ICD-10-CM

## 2024-09-24 PROCEDURE — 99213 OFFICE O/P EST LOW 20 MIN: CPT | Mod: PBBFAC,25 | Performed by: SPECIALIST

## 2024-09-24 PROCEDURE — 95983 ALYS BRN NPGT PRGRMG 15 MIN: CPT | Mod: S$PBB,,, | Performed by: SPECIALIST

## 2024-09-24 PROCEDURE — 99999 PR PBB SHADOW E&M-EST. PATIENT-LVL III: CPT | Mod: PBBFAC,,, | Performed by: SPECIALIST

## 2024-09-24 PROCEDURE — 99215 OFFICE O/P EST HI 40 MIN: CPT | Mod: S$PBB,,, | Performed by: SPECIALIST

## 2024-09-24 PROCEDURE — 95984 ALYS BRN NPGT PRGRMG ADDL 15: CPT | Mod: S$PBB,,, | Performed by: SPECIALIST

## 2024-09-24 PROCEDURE — 95983 ALYS BRN NPGT PRGRMG 15 MIN: CPT | Mod: PBBFAC

## 2024-09-24 PROCEDURE — 95984 ALYS BRN NPGT PRGRMG ADDL 15: CPT | Mod: PBBFAC

## 2024-09-24 RX ORDER — ACETAMINOPHEN, DIPHENHYDRAMINE HCL, PHENYLEPHRINE HCL 325; 25; 5 MG/1; MG/1; MG/1
TABLET ORAL
COMMUNITY

## 2024-09-24 RX ORDER — LISINOPRIL 10 MG/1
10 TABLET ORAL
COMMUNITY
Start: 2024-09-17

## 2024-09-24 RX ORDER — GABAPENTIN 300 MG/1
300 CAPSULE ORAL 3 TIMES DAILY
Qty: 90 CAPSULE | Refills: 11 | Status: SHIPPED | OUTPATIENT
Start: 2024-09-24 | End: 2025-09-24

## 2024-09-24 NOTE — PROGRESS NOTES
Established Parkinson's Patient   SUBJECTIVE:  Patient ID: Raul Desai   73 y.o.     Chief Complaint: F/U PD-DBS.    History of Present Illness:  Raul Desai is a 73 y.o. male who presents to clinic for Parkinsons Disease follow-up      Denies any tremors. Ambulates w walker, has a freezing-shuffled gait. Stops at thresholds. Diff w speech and drooling--in speech therapy. Denies diff w swallowing or hallucinations, Sleeping well makes noise in his sleep. Does not drive, lives at home w his wife and needs asst w dressing and bathing. Still having frequent falls, most recent fall Sunday. Admits dyskinesias, mostly of right foot. Needs refill of Gabapentin. Pts wife is here.     Currently taking CD-LD  CR 1.5 tabs at bedtime, CD-LD  2 tabs QID, amantadine 100mg QID, donepezil 10mg daily    Wife states when he takes 2 CD-LD CR at bedtime he acts out his dreams. Now taking 1.5 tabs at night and sleeping well/RBD issues resolved.     Review of Systems: as per HPI, otherwise a balanced 10 systems review is negative.        Past Medical History:   Diagnosis Date    Facet arthropathy, lumbar     Hyperlipidemia     Hypertension     Low back pain     resolved    Lumbar stenosis with neurogenic claudication     Other intervertebral disc degeneration, lumbar region     Parkinson's disease     Prostate cancer 2003       Past Surgical History:   Procedure Laterality Date    ACL repair in left knee  1983    bilateral L2-3, L3-4, L4-5 decompression  05/13/2021    [facility]    Bilateral STN DBS  04/17/2018    [facility]    BRAIN SURGERY  4/17/2018    DBS surgery    COLONOSCOPY  11/2020    DEEP BRAIN STIMULATOR PLACEMENT      EYE SURGERY  9/13/2022 & 10/11/22    right & left cataracts    FETAL RADIO FREQUENCY ABLATION      IPG placement  05/01/2018    [facility]    PROSTATECTOMY      REPLACEMENT, BATTERY, DEEP BRAIN STIMULATOR Right 07/05/2022    Procedure: REPLACEMENT, BATTERY, DEEP BRAIN STIMULATOR;  Surgeon: Azam RANDALL  "MD Regino;  Location: [facility];  Service: Neurosurgery;  Laterality: Right;  remove/replace IPG for DBS // DIMITRI / RAY    SPINE SURGERY  2021    laminectomy lumbar MIS       Family History   Problem Relation Name Age of Onset    Osteoporosis Mother      Colon cancer Mother      Breast cancer Mother      Cancer Mother      Hypertension Father      Cancer Father      Breast cancer Sister      Cancer Sister         Social History     Socioeconomic History    Marital status:    Tobacco Use    Smoking status: Never    Smokeless tobacco: Never   Substance and Sexual Activity    Alcohol use: Not Currently    Drug use: Never       Review of patient's allergies indicates:   Allergen Reactions    Zolpidem Hallucinations       Current Medications:  Current Outpatient Medications   Medication Instructions    amantadine HCL (SYMMETREL) 100 mg capsule TAKE 1 CAPSULE(100 MG) BY MOUTH FOUR TIMES DAILY    carbidopa-levodopa  mg (SINEMET CR)  mg TbSR TAKE 1 TO 2 TABLETS BY MOUTH AT BEDTIME    carbidopa-levodopa  mg (SINEMET)  mg per tablet TAKE 2 TABLET BY MOUTH FOUR TIMES DAILY    cholecalciferol (vitamin D3) 4,000 Units, Oral, Daily    donepeziL (ARICEPT) 10 MG tablet TAKE 1/2 TABLET BY MOUTH EVERY MORNING FOR 4 WEEKS THEN TAKE 1 TABLET BY MOUTH DAILY THEREAFTER    EScitalopram oxalate (LEXAPRO) 10 mg, Oral    gabapentin (NEURONTIN) 300 mg, Oral, 3 times daily    lisinopriL 10 mg, Oral    melatonin (MELATIN) 10 mg, Nightly    melatonin 10 mg Tab Oral    MULTIVITAMIN ORAL Oral    rosuvastatin (CRESTOR) 1 mg, Oral, Daily       OBJECTIVE:  Vitals:  BP (!) 160/82   Ht 5' 7" (1.702 m)   Wt 97.5 kg (215 lb)   BMI 33.67 kg/m²      Physical Exam:  General Exam  Accompanied by - wife  appearance - flat affect; decreased eye blink; in wheelchair  heart - RRR auscultated without murmur  lungs - pulmonary effort normal  skin- normal for " "ethnicity, no obvious lesions noted  Dyskinesias in R foot    Neurologic Exam  Cortical function - The patient is alert, attentive, and oriented  Speech - hypophonia, stutters, dysarthric; speech strained with bilateral DBS on  Cranial nerves:  CN III, IV, VI -  EOMs intact. No ptosis or lateral gaze deviation  CN VII - no facial asymmetry; normal eye closure and  CN VIII - hearing is grossly normal  Motor - Muscle bulk and tone normal  Gait - unable to assess, WC  Coordination - finger to nose intact, no dysmetria  tremor - trace resting R hand     Review of Data:   Prior notes reviewed     Imaging:  CT head without 6/28/24:  "Chronic age-related changes.  No acute process"    ASSESSMENT /PLAN:    1. Parkinson's disease with dyskinesia and fluctuating manifestations    2. Dementia due to Parkinson's disease, unspecified dementia severity, unspecified whether behavioral, psychotic, or mood disturbance or anxiety    3. Multiple falls    4. S/P deep brain stimulator placement      Continue CD/LD IR 2 tabs QID and CD/LD 1.5 tabs qhs.  Continue amantadine 100mg QIDDBS interrogated/changed today. Moi 100% (rechargeable). Impedance ok.  Gabapentin refilled today        DBS change:   L STN: 2.3 v, 90ms, 125hz (2 electrodes #1, #2).   R STN program 1: 1.4 V, 90ms, 125hz; program 2: turned off  See face sheet for previous settings  -after these changes, the dyskinesias in his right foot stopped and speech seemed less strained.     Parkinson's medications can be associated with certain side effects.  Nausea and abdominal symptoms typically improve in time.  Impulse control disorders including persistent thoughts or behaviors involving shopping, gambling, or sex can be problematic.  Excessive daytime sleepiness including car crashes have been reported.   Delusions, hallucinations, and paranoia can also occur, typically with higher doses in older patients.   Abrupt stoppage of high dose parkinson's medications can be medically " troublesome.      Fall risks / precautions discussed.    Questions and concerns were sought and answered to the patient's stated verbal satisfaction.    The patient verbalizes understanding and agreement with the above stated treatment plan.   Items discussed include acute and/or chronic neurological, sleep, or other issues and their attendant differential diagnoses.  Potential for additional testing, treatment options, and prognosis also discussed.    Follow-up: February/ March     Dr. Adams physically present in exam room during patient encounter.   I, Sonia Mcgregor NP acted solely as a scribe for and in the presence of Dr. Adams who performed the service.    Sonia Mcgregor, MSN, APRN, FNP-C  [facility]  [phone]

## 2025-01-08 DIAGNOSIS — G20.A1 PARKINSON'S DISEASE: Primary | ICD-10-CM

## 2025-01-08 RX ORDER — CARBIDOPA AND LEVODOPA 25; 100 MG/1; MG/1
TABLET ORAL
Qty: 736 TABLET | Refills: 2 | Status: SHIPPED | OUTPATIENT
Start: 2025-01-08

## 2025-02-04 ENCOUNTER — OFFICE VISIT (OUTPATIENT)
Dept: NEUROLOGY | Facility: CLINIC | Age: 75
End: 2025-02-04
Payer: MEDICARE

## 2025-02-04 VITALS
BODY MASS INDEX: 35.31 KG/M2 | DIASTOLIC BLOOD PRESSURE: 70 MMHG | WEIGHT: 225 LBS | HEIGHT: 67 IN | SYSTOLIC BLOOD PRESSURE: 124 MMHG

## 2025-02-04 DIAGNOSIS — G20.B2 PARKINSON'S DISEASE WITH DYSKINESIA AND FLUCTUATING MANIFESTATIONS: Primary | Chronic | ICD-10-CM

## 2025-02-04 DIAGNOSIS — Z96.89 S/P DEEP BRAIN STIMULATOR PLACEMENT: Chronic | ICD-10-CM

## 2025-02-04 DIAGNOSIS — F02.80 DEMENTIA DUE TO PARKINSON'S DISEASE, UNSPECIFIED DEMENTIA SEVERITY, UNSPECIFIED WHETHER BEHAVIORAL, PSYCHOTIC, OR MOOD DISTURBANCE OR ANXIETY: ICD-10-CM

## 2025-02-04 DIAGNOSIS — G20.A1 DEMENTIA DUE TO PARKINSON'S DISEASE, UNSPECIFIED DEMENTIA SEVERITY, UNSPECIFIED WHETHER BEHAVIORAL, PSYCHOTIC, OR MOOD DISTURBANCE OR ANXIETY: ICD-10-CM

## 2025-02-04 PROCEDURE — 99213 OFFICE O/P EST LOW 20 MIN: CPT | Mod: PBBFAC | Performed by: SPECIALIST

## 2025-02-04 PROCEDURE — 99999 PR PBB SHADOW E&M-EST. PATIENT-LVL III: CPT | Mod: PBBFAC,,, | Performed by: SPECIALIST

## 2025-02-04 PROCEDURE — 95976 ALYS SMPL CN NPGT PRGRMG: CPT | Mod: S$PBB,,, | Performed by: SPECIALIST

## 2025-02-04 PROCEDURE — 99214 OFFICE O/P EST MOD 30 MIN: CPT | Mod: 25,S$PBB,, | Performed by: SPECIALIST

## 2025-02-04 PROCEDURE — 95976 ALYS SMPL CN NPGT PRGRMG: CPT | Mod: PBBFAC | Performed by: SPECIALIST

## 2025-02-04 NOTE — PROGRESS NOTES
"Subjective:     Patient ID: Raul Desai is a 74 y.o. male.    Chief Complaint: PD/parkinsonism follow up   HPI:           DBS Programming (Patient presents for f/u visit for Parkinsons Disease and DBS check. Pt. Reports his DBS is doing well; Pt. States he's still having some difficulty feeding himself with spoon -to- mouth; Reports vivid dreams; uses walker at home to ambulate;  States no change to tremors; Dystonia to rt. Leg and having difficulty with left lleg with numbness to lower leg and foot.)        notes may also be on facesheet for HPI, ROS, and other sections     Neurological ROS:   Sleep:  RBD:   Falls:  Hallucinations:       Current Outpatient Medications   Medication Instructions    amantadine HCL (SYMMETREL) 100 mg capsule TAKE 1 CAPSULE(100 MG) BY MOUTH FOUR TIMES DAILY    carbidopa-levodopa  mg (SINEMET CR)  mg TbSR TAKE 1 TO 2 TABLETS BY MOUTH AT BEDTIME    carbidopa-levodopa  mg (SINEMET)  mg per tablet TAKE 2 TABLETS BY MOUTH FOUR TIMES DAILY    cholecalciferol (vitamin D3) 4,000 Units, Daily    donepeziL (ARICEPT) 10 MG tablet TAKE 1/2 TABLET BY MOUTH EVERY MORNING FOR 4 WEEKS THEN TAKE 1 TABLET BY MOUTH DAILY THEREAFTER    EScitalopram oxalate (LEXAPRO) 10 mg, Oral    gabapentin (NEURONTIN) 300 mg, Oral, 3 times daily    lisinopriL 10 mg    melatonin (MELATIN) 10 mg, Nightly    melatonin 10 mg Tab Take by mouth.    MULTIVITAMIN ORAL Take by mouth.    rosuvastatin (CRESTOR) 1 mg, Daily     Objective:   If Accompanied, by: w    Exam  /70 (BP Location: Right arm, Patient Position: Sitting)   Ht 5' 7" (1.702 m)   Wt 102.1 kg (225 lb)   BMI 35.24 kg/m²     Heart:   Extr:    Speech:  Dysarthric   EOMs:   Gait: in wheelch     Tremor: LUE tremor when off   Bradykinesia:   Dyskinesias: has some of his RLE     Neuroimaging:    DBS, if present:   Settings:     Programming time:  [x] 15 min []addn 15 min  did not change settings   when turned off his LUE shook    " his speech was clearer when device off I think     Complexity of Data:     [] High  [x] Moderate   Risks:   [x] High   [] Moderate   MDM:      [] High   [x] Moderate    Assessment/Plan:       ICD-10-CM ICD-9-CM   1. Parkinson's disease with dyskinesia and fluctuating manifestations  G20.B2 332.0   2. S/P deep brain stimulator placement  Z96.89 V45.89   3. Dementia due to Parkinson's disease, unspecified dementia severity, unspecified whether behavioral, psychotic, or mood disturbance or anxiety  G20.A1 332.0    F02.80 294.10     Other comments/ follow up:    *Parkinson's medications can be associated with certain side effects. Nausea and abdominal symptoms typically improve in time.  Impulse control disorders including persistent thoughts or behaviors involving shopping gambling or sex can be problematic.  Excessive daytime sleepiness including car crashes have been reported. Delusions hallucinations and paranoia can also occur, typically with higher doses in older patients.   Abrupt stoppage of high dose parkinson's medications can be medically troublesome.    No orders of the defined types were placed in this encounter.   Aim 6mos f to f w me

## 2025-04-15 DIAGNOSIS — F48.2 PSEUDOBULBAR AFFECT: Chronic | ICD-10-CM

## 2025-04-15 DIAGNOSIS — G20.A1 PARKINSON'S DISEASE: Primary | Chronic | ICD-10-CM

## 2025-04-15 RX ORDER — ESCITALOPRAM OXALATE 10 MG/1
10 TABLET ORAL
Qty: 90 TABLET | Refills: 1 | Status: SHIPPED | OUTPATIENT
Start: 2025-04-15

## 2025-04-21 DIAGNOSIS — G20.A1 PARKINSON'S DISEASE: Chronic | ICD-10-CM

## 2025-04-21 RX ORDER — AMANTADINE HYDROCHLORIDE 100 MG/1
100 CAPSULE, GELATIN COATED ORAL 4 TIMES DAILY
Qty: 120 CAPSULE | Refills: 5 | Status: SHIPPED | OUTPATIENT
Start: 2025-04-21

## 2025-06-12 ENCOUNTER — OFFICE VISIT (OUTPATIENT)
Dept: ORTHOPEDICS | Facility: CLINIC | Age: 75
End: 2025-06-12
Payer: MEDICARE

## 2025-06-12 VITALS
SYSTOLIC BLOOD PRESSURE: 129 MMHG | WEIGHT: 225.06 LBS | HEART RATE: 72 BPM | BODY MASS INDEX: 35.33 KG/M2 | HEIGHT: 67 IN | DIASTOLIC BLOOD PRESSURE: 81 MMHG

## 2025-06-12 DIAGNOSIS — M17.12 PRIMARY OSTEOARTHRITIS OF LEFT KNEE: Primary | ICD-10-CM

## 2025-06-12 RX ORDER — ASPIRIN 81 MG/1
81 TABLET ORAL DAILY
COMMUNITY

## 2025-06-12 RX ORDER — OLMESARTAN MEDOXOMIL 40 MG/1
40 TABLET ORAL
COMMUNITY
Start: 2025-06-11

## 2025-06-12 RX ORDER — BETAMETHASONE SODIUM PHOSPHATE AND BETAMETHASONE ACETATE 3; 3 MG/ML; MG/ML
12 INJECTION, SUSPENSION INTRA-ARTICULAR; INTRALESIONAL; INTRAMUSCULAR; SOFT TISSUE
Status: DISCONTINUED | OUTPATIENT
Start: 2025-06-12 | End: 2025-06-12 | Stop reason: HOSPADM

## 2025-06-12 RX ADMIN — BETAMETHASONE SODIUM PHOSPHATE AND BETAMETHASONE ACETATE 12 MG: 3; 3 INJECTION, SUSPENSION INTRA-ARTICULAR; INTRALESIONAL; INTRAMUSCULAR; SOFT TISSUE at 10:06

## 2025-06-12 NOTE — PROCEDURES
Large Joint Aspiration/Injection: L knee    Date/Time: 6/12/2025 10:30 AM    Performed by: Titi Figueroa PA-C  Authorized by: Titi Figueroa PA-C    Consent Done?:  Yes (Verbal)  Indications:  Arthritis  Timeout: prior to procedure the correct patient, procedure, and site was verified    Prep: patient was prepped and draped in usual sterile fashion      Local anesthesia used?: Yes    Local anesthetic:  Lidocaine 2% without epinephrine    Details:  Needle Size:  25 G  Ultrasonic Guidance for needle placement?: No    Location:  Knee  Site:  L knee  Medications:  12 mg betamethasone acetate-betamethasone sodium phosphate 6 mg/mL  Aspirate amount (mL):  20  Aspirate:  Clear and blood-tinged  Patient tolerance:  Patient tolerated the procedure well with no immediate complications

## 2025-06-12 NOTE — PROGRESS NOTES
"Chief Complaint:   Chief Complaint   Patient presents with    Knee Pain     L knee pain - ongoing for a couple months. Last injection/aspiration 06/27/2024 with relief. States he does not like to put weight on it.        History of present illness:    This is a 74 y.o. year old   History of Present Illness    CHIEF COMPLAINT:  - Left knee pain    HPI:  Raul presents for evaluation of left knee pain, which he describes as severe and persistent. Pain is deep and on the sides of the left knee and is not improving. He has limited mobility, using a wheelchair due to severe dystonia in his right knee. He reports minimal walking, though the exact duration is unclear.    He has a history of two ACL surgeries on the left knee. He received a cortisone injection six months ago, which provided relief for that duration. He has a history of falls, with his wife reporting one or two falls per month. He primarily uses a power wheelchair and performs transfers with assistance. He had a negative experience with PT about 10 months ago, which led to muscle lock-up and an emergency room visit.    He takes aspirin as a anticoagulant, as prescribed by Dr. Tovar, though there is some disagreement among his doctors about its necessity. He denies any symptoms of knee infection such as redness or heat. Cortisone injection: Approximately 6 months ago, provided significant benefit for 6 months  Viscosupplementation injection: Received from Dr. Daniel Servin, did not provide significant benefit    IMAGING:  - XR Left Knee: Last year, shows significant degenerative changes, described as "bone on bone" with complete loss of articular cartilage    SURGICAL HISTORY:  - ACL surgery on left knee: 1982 or 1983, performed by Dr. Casanova  - Two surgeries on the ACL: Dates and details not provided          Current Outpatient Medications   Medication Sig    amantadine HCL (SYMMETREL) 100 mg capsule Take 1 capsule (100 mg total) by mouth 4 (four) times " "daily. TAKE 1 CAPSULE(100 MG) BY MOUTH FOUR TIMES DAILY    aspirin (ECOTRIN) 81 MG EC tablet Take 81 mg by mouth once daily.    carbidopa-levodopa  mg (SINEMET CR)  mg TbSR TAKE 1 TO 2 TABLETS BY MOUTH AT BEDTIME    cholecalciferol, vitamin D3, 100 mcg (4,000 unit) Cap capsule Take 4,000 Units by mouth once daily.    donepeziL (ARICEPT) 10 MG tablet TAKE 1/2 TABLET BY MOUTH EVERY MORNING FOR 4 WEEKS THEN TAKE 1 TABLET BY MOUTH DAILY THEREAFTER    EScitalopram oxalate (LEXAPRO) 10 MG tablet TAKE 1 TABLET(10 MG) BY MOUTH EVERY DAY    gabapentin (NEURONTIN) 300 MG capsule Take 1 capsule (300 mg total) by mouth 3 (three) times daily.    melatonin 10 mg Tab Take by mouth.    MULTIVITAMIN ORAL Take by mouth.    olmesartan (BENICAR) 40 MG tablet Take 40 mg by mouth.    rosuvastatin (CRESTOR) 10 MG tablet Take 1 mg by mouth once daily.    carbidopa-levodopa  mg (SINEMET)  mg per tablet TAKE 2 TABLETS BY MOUTH FOUR TIMES DAILY    lisinopriL 10 MG tablet Take 10 mg by mouth.    melatonin (MELATIN) 5 mg Take 10 mg by mouth nightly.     No current facility-administered medications for this visit.       Review of Systems:    Constitution:   Denies chills, fever, and sweats.  HENT:   Denies headaches or blurry vision.  Cardiovascular:  Denies chest pain or irregular heart beat.  Respiratory:   Denies cough or shortness of breath.  Gastrointestinal:  Denies abdominal pain, nausea, or vomiting.  Musculoskeletal:   Denies muscle cramps.  Neurological:   Denies dizziness or focal weakness.  Psychiatric/Behavior: Normal mental status.  Hematology/Lymph:  Denies bleeding problem or easy bruising/bleeding.  Skin:    Denies rash or suspicious lesions.    Examination:    Vital Signs:    Vitals:    06/12/25 1016   BP: 129/81   Pulse: 72   Weight: 102.1 kg (225 lb 1.4 oz)   Height: 5' 7" (1.702 m)       Body mass index is 35.25 kg/m².    Constitution:   Well-developed, well nourished patient in no acute " distress.  Neurological:   Alert and oriented x 3 and cooperative to examination.     Psychiatric/Behavior: Normal mental status.  Respiratory:   No shortness of breath.  Eyes:    Extraoccular muscles intact  Skin:    No scars, rash or suspicious lesions.    Physical Exam:   Left Knee     Grade effusion 1    No erythema, warmth bruising     active flexion 100   active extension 5    Tender over medial joint line  Tender over MCL   No lateral compartment tenderness   Left  Maurice test   Left  lachman test   No instability on varus or valgus stress   No weakness of the  knee was observed.    Imaging: X-rays ordered and images interpreted today personally by me of left knee four views   Patient has extensive arthritic change of the left knee with bone-on-bone contact and a varus deformity.    He has previous hardware from ligament reconstruction numerous years ago.            Assessment: Primary osteoarthritis of left knee  -     Large Joint Aspiration/Injection: L knee         Plan:    Assessment & Plan    OSTEOARTHRITIS OF LEFT KNEE:  - Performed aspiration of fluid from the left knee.  - Administered left knee cortisone injection after aspiration.  - Discussed potential future use of viscosupplementation if cortisone injections become ineffective.  - Ordered XR Left Knee at next visit (in about 1 year) to assess for any changes.    DEPENDENCE ON WHEELCHAIR:  - Use power chair for mobility.    HISTORY OF FALLING:  - Use caution during transfers.  - Utilize grab bars in bathroom area.    FOLLOW-UP:  - Follow up when pain becomes substantial and another injection is needed.  - Contact the office to schedule next appointment.  - Follow up in about 1 year for updated X-ray.          Large Joint Aspiration/Injection: L knee    Date/Time: 6/12/2025 10:30 AM    Performed by: Titi Figueroa PA-C  Authorized by: Titi Figueroa PA-C    Consent Done?:  Yes (Verbal)  Indications:  Arthritis  Timeout: prior to  procedure the correct patient, procedure, and site was verified    Prep: patient was prepped and draped in usual sterile fashion      Local anesthesia used?: Yes    Local anesthetic:  Lidocaine 2% without epinephrine    Details:  Needle Size:  25 G  Ultrasonic Guidance for needle placement?: No    Location:  Knee  Site:  L knee  Medications:  12 mg betamethasone acetate-betamethasone sodium phosphate 6 mg/mL  Patient tolerance:  Patient tolerated the procedure well with no immediate complications       This note was generated with the assistance of ambient listening technology. Verbal consent was obtained by the patient and accompanying visitor(s) for the recording of patient appointment to facilitate this note. I attest to having reviewed and edited the generated note for accuracy, though some syntax or spelling errors may persist. Please contact the author of this note for any clarification.       DISCLAIMER: This note may have been dictated using voice recognition software and may contain grammatical errors.     NOTE: Consult report sent to referring provider via [facility].

## 2025-07-07 ENCOUNTER — PATIENT MESSAGE (OUTPATIENT)
Dept: NEUROLOGY | Facility: CLINIC | Age: 75
End: 2025-07-07
Payer: MEDICARE

## 2025-07-07 DIAGNOSIS — G20.A1 DEMENTIA DUE TO PARKINSON'S DISEASE, UNSPECIFIED DEMENTIA SEVERITY, UNSPECIFIED WHETHER BEHAVIORAL, PSYCHOTIC, OR MOOD DISTURBANCE OR ANXIETY: ICD-10-CM

## 2025-07-07 DIAGNOSIS — F02.80 DEMENTIA DUE TO PARKINSON'S DISEASE, UNSPECIFIED DEMENTIA SEVERITY, UNSPECIFIED WHETHER BEHAVIORAL, PSYCHOTIC, OR MOOD DISTURBANCE OR ANXIETY: Primary | ICD-10-CM

## 2025-07-07 DIAGNOSIS — G20.A1 DEMENTIA DUE TO PARKINSON'S DISEASE, UNSPECIFIED DEMENTIA SEVERITY, UNSPECIFIED WHETHER BEHAVIORAL, PSYCHOTIC, OR MOOD DISTURBANCE OR ANXIETY: Primary | ICD-10-CM

## 2025-07-07 DIAGNOSIS — F02.80 DEMENTIA DUE TO PARKINSON'S DISEASE, UNSPECIFIED DEMENTIA SEVERITY, UNSPECIFIED WHETHER BEHAVIORAL, PSYCHOTIC, OR MOOD DISTURBANCE OR ANXIETY: ICD-10-CM

## 2025-07-07 RX ORDER — DONEPEZIL HYDROCHLORIDE 10 MG/1
10 TABLET, FILM COATED ORAL DAILY
Qty: 90 TABLET | Refills: 2 | Status: SHIPPED | OUTPATIENT
Start: 2025-07-07 | End: 2025-07-07 | Stop reason: SDUPTHER

## 2025-07-07 RX ORDER — DONEPEZIL HYDROCHLORIDE 10 MG/1
10 TABLET, FILM COATED ORAL DAILY
Qty: 90 TABLET | Refills: 2 | Status: SHIPPED | OUTPATIENT
Start: 2025-07-07

## 2025-07-24 ENCOUNTER — PATIENT MESSAGE (OUTPATIENT)
Dept: NEUROLOGY | Facility: CLINIC | Age: 75
End: 2025-07-24
Payer: MEDICARE

## 2025-08-20 ENCOUNTER — OFFICE VISIT (OUTPATIENT)
Dept: NEUROLOGY | Facility: CLINIC | Age: 75
End: 2025-08-20
Payer: MEDICARE

## 2025-08-20 VITALS
WEIGHT: 225 LBS | DIASTOLIC BLOOD PRESSURE: 88 MMHG | HEIGHT: 67 IN | BODY MASS INDEX: 35.31 KG/M2 | SYSTOLIC BLOOD PRESSURE: 134 MMHG

## 2025-08-20 DIAGNOSIS — G20.B2 PARKINSON'S DISEASE WITH DYSKINESIA AND FLUCTUATING MANIFESTATIONS: Primary | Chronic | ICD-10-CM

## 2025-08-20 DIAGNOSIS — M48.062 SPINAL STENOSIS OF LUMBAR REGION WITH NEUROGENIC CLAUDICATION: Chronic | ICD-10-CM

## 2025-08-20 DIAGNOSIS — Z96.89 S/P DEEP BRAIN STIMULATOR PLACEMENT: Chronic | ICD-10-CM

## 2025-08-20 PROCEDURE — 99214 OFFICE O/P EST MOD 30 MIN: CPT | Mod: S$PBB,,, | Performed by: SPECIALIST

## 2025-08-20 PROCEDURE — 99999 PR PBB SHADOW E&M-EST. PATIENT-LVL III: CPT | Mod: PBBFAC,,, | Performed by: SPECIALIST

## 2025-08-20 PROCEDURE — 99213 OFFICE O/P EST LOW 20 MIN: CPT | Mod: PBBFAC | Performed by: SPECIALIST

## 2025-08-20 RX ORDER — PERFLUOROHEXYLOCTANE 1 MG/MG
SOLUTION OPHTHALMIC
COMMUNITY
Start: 2025-08-13

## (undated) DEVICE — NDL HYPO 22GX1 1/2 SYR 10ML LL

## (undated) DEVICE — GLOVE PROTEXIS BLUE LATEX 6.5

## (undated) DEVICE — COVER PROBE US 5.5X58L NON LTX

## (undated) DEVICE — DRAPE INCISE IOBAN 2 23X23IN

## (undated) DEVICE — SOL NACL IRR 1000ML BTL

## (undated) DEVICE — Device

## (undated) DEVICE — SUT VICRYL 2-0 8-18 CP-2

## (undated) DEVICE — GLOVE PROTEXIS BLUE LATEX 8

## (undated) DEVICE — ADHESIVE DERMABOND ADVANCED

## (undated) DEVICE — GLOVE PROTEXIS PI SYN SURG 7.5

## (undated) DEVICE — GLOVE PROTEXIS PI SYN SURG 6.0

## (undated) DEVICE — POSITIONER HEEL FOAM CONVOLTD

## (undated) DEVICE — SHEET XLGE DRAPE

## (undated) DEVICE — SUT VICRYL 0 CT 2 ANTIMICRO

## (undated) DEVICE — BLADE PEAK PLASMA

## (undated) DEVICE — SUT STRATAFIX 4-0 30CM PS-2

## (undated) DEVICE — DURAPREP SURG SCRUB 26ML

## (undated) DEVICE — SUT PROLENE 3-0 SH DA 36 BL

## (undated) DEVICE — DRAPE FLUID WARMER 44X44IN

## (undated) DEVICE — GLOVE PROTEXIS BLUE LATEX 7

## (undated) DEVICE — HANDSET PTNT PRO DBS MVT DISOR

## (undated) DEVICE — GLOVE PROTEXIS PI SYN SURG 6.5

## (undated) DEVICE — ELECTRODE PATIENT RETURN DISP

## (undated) DEVICE — MARKER WRITESITE SKIN CHLRAPRP